# Patient Record
Sex: FEMALE | Race: WHITE | NOT HISPANIC OR LATINO | Employment: OTHER | URBAN - METROPOLITAN AREA
[De-identification: names, ages, dates, MRNs, and addresses within clinical notes are randomized per-mention and may not be internally consistent; named-entity substitution may affect disease eponyms.]

---

## 2017-04-04 ENCOUNTER — TRANSCRIBE ORDERS (OUTPATIENT)
Dept: ADMINISTRATIVE | Facility: HOSPITAL | Age: 67
End: 2017-04-04

## 2017-04-04 DIAGNOSIS — R07.9 CHEST PAIN, UNSPECIFIED: ICD-10-CM

## 2017-04-04 DIAGNOSIS — R06.03 ACUTE RESPIRATORY DISTRESS: Primary | ICD-10-CM

## 2017-04-07 ENCOUNTER — TRANSCRIBE ORDERS (OUTPATIENT)
Dept: ADMINISTRATIVE | Facility: HOSPITAL | Age: 67
End: 2017-04-07

## 2017-04-14 ENCOUNTER — HOSPITAL ENCOUNTER (OUTPATIENT)
Dept: NON INVASIVE DIAGNOSTICS | Facility: CLINIC | Age: 67
Discharge: HOME | End: 2017-04-14
Payer: COMMERCIAL

## 2017-04-14 DIAGNOSIS — R07.9 CHEST PAIN, UNSPECIFIED: ICD-10-CM

## 2017-04-14 DIAGNOSIS — R06.03 ACUTE RESPIRATORY DISTRESS: ICD-10-CM

## 2017-04-14 PROCEDURE — A9502 TC99M TETROFOSMIN: HCPCS

## 2017-04-14 PROCEDURE — 78452 HT MUSCLE IMAGE SPECT MULT: CPT

## 2017-04-14 PROCEDURE — 93017 CV STRESS TEST TRACING ONLY: CPT

## 2017-04-14 RX ADMIN — REGADENOSON 0.4 MG: 0.08 INJECTION, SOLUTION INTRAVENOUS at 10:15

## 2017-04-25 LAB
MAX DIASTOLIC BP: 61 MMHG
MAX HEART RATE: 99 BPM
MAX PREDICTED HEART RATE: 156 BPM
MAX. SYSTOLIC BP: 142 MMHG
PROTOCOL NAME: NORMAL
REASON FOR TERMINATION: NORMAL
TARGET HR FORMULA: NORMAL
TEST INDICATION: NORMAL
TIME IN EXERCISE PHASE: 91 S

## 2017-04-26 ENCOUNTER — ALLSCRIPTS OFFICE VISIT (OUTPATIENT)
Dept: OTHER | Facility: OTHER | Age: 67
End: 2017-04-26

## 2018-01-13 VITALS
WEIGHT: 293 LBS | BODY MASS INDEX: 47.09 KG/M2 | HEART RATE: 68 BPM | DIASTOLIC BLOOD PRESSURE: 60 MMHG | SYSTOLIC BLOOD PRESSURE: 138 MMHG | HEIGHT: 66 IN | RESPIRATION RATE: 16 BRPM

## 2018-01-29 ENCOUNTER — TRANSCRIBE ORDERS (OUTPATIENT)
Dept: ADMINISTRATIVE | Facility: HOSPITAL | Age: 68
End: 2018-01-29

## 2018-01-29 DIAGNOSIS — Z12.39 SCREENING BREAST EXAMINATION: Primary | ICD-10-CM

## 2018-01-29 DIAGNOSIS — Z13.820 SCREENING FOR OSTEOPOROSIS: ICD-10-CM

## 2018-02-05 ENCOUNTER — HOSPITAL ENCOUNTER (OUTPATIENT)
Dept: MAMMOGRAPHY | Facility: CLINIC | Age: 68
Discharge: HOME | End: 2018-02-05
Payer: COMMERCIAL

## 2018-02-05 DIAGNOSIS — Z12.39 SCREENING BREAST EXAMINATION: ICD-10-CM

## 2018-02-05 DIAGNOSIS — Z13.820 SCREENING FOR OSTEOPOROSIS: ICD-10-CM

## 2018-02-05 PROCEDURE — 77067 SCR MAMMO BI INCL CAD: CPT

## 2018-04-06 RX ORDER — TERAZOSIN 5 MG/1
2 CAPSULE ORAL
COMMUNITY
Start: 2015-10-01

## 2018-04-06 RX ORDER — TRAMADOL HYDROCHLORIDE 50 MG/1
1 TABLET ORAL EVERY 6 HOURS PRN
COMMUNITY
Start: 2015-10-01

## 2018-04-06 RX ORDER — FERROUS SULFATE 325(65) MG
1 TABLET ORAL
COMMUNITY
Start: 2015-10-01

## 2018-04-06 RX ORDER — ATORVASTATIN CALCIUM 80 MG/1
1 TABLET, FILM COATED ORAL
COMMUNITY
Start: 2015-10-01

## 2018-04-06 RX ORDER — LANSOPRAZOLE 30 MG/1
1 CAPSULE, DELAYED RELEASE ORAL DAILY
COMMUNITY
Start: 2015-10-01

## 2018-04-06 RX ORDER — HYDROCHLOROTHIAZIDE 25 MG/1
1 TABLET ORAL DAILY
COMMUNITY
Start: 2015-10-01 | End: 2018-12-12 | Stop reason: ALTCHOICE

## 2018-04-06 RX ORDER — FLUTICASONE PROPIONATE 50 MCG
2 SPRAY, SUSPENSION (ML) NASAL DAILY PRN
COMMUNITY
Start: 2015-10-01

## 2018-04-06 RX ORDER — AMLODIPINE BESYLATE 5 MG/1
1 TABLET ORAL DAILY
COMMUNITY
Start: 2015-10-01

## 2018-04-06 RX ORDER — LANCETS
EACH MISCELLANEOUS
COMMUNITY
Start: 2015-12-15

## 2018-04-06 RX ORDER — LEVOTHYROXINE SODIUM ANHYDROUS 200 UG/5ML
INJECTION, POWDER, LYOPHILIZED, FOR SOLUTION INTRAVENOUS
COMMUNITY
End: 2018-05-04 | Stop reason: HOSPADM

## 2018-04-06 RX ORDER — GLIPIZIDE 5 MG/1
1 TABLET ORAL 2 TIMES DAILY
COMMUNITY
Start: 2015-10-01

## 2018-04-06 RX ORDER — CARVEDILOL 25 MG/1
1 TABLET ORAL 2 TIMES DAILY
COMMUNITY
Start: 2015-10-06

## 2018-04-06 RX ORDER — POTASSIUM CHLORIDE 20 MEQ/1
TABLET, EXTENDED RELEASE ORAL 2 TIMES DAILY PRN
COMMUNITY
Start: 2015-10-01

## 2018-04-06 RX ORDER — LOSARTAN POTASSIUM 100 MG/1
1 TABLET ORAL DAILY
COMMUNITY
Start: 2015-10-01

## 2018-04-11 ENCOUNTER — OFFICE VISIT (OUTPATIENT)
Dept: CARDIOLOGY CLINIC | Facility: CLINIC | Age: 68
End: 2018-04-11
Payer: COMMERCIAL

## 2018-04-11 VITALS
HEART RATE: 73 BPM | DIASTOLIC BLOOD PRESSURE: 80 MMHG | BODY MASS INDEX: 47.09 KG/M2 | WEIGHT: 293 LBS | HEIGHT: 66 IN | SYSTOLIC BLOOD PRESSURE: 160 MMHG

## 2018-04-11 DIAGNOSIS — E78.00 HYPERCHOLESTEROLEMIA: ICD-10-CM

## 2018-04-11 DIAGNOSIS — I10 HYPERTENSION, ESSENTIAL, BENIGN: ICD-10-CM

## 2018-04-11 DIAGNOSIS — I25.10 CORONARY ARTERY DISEASE INVOLVING NATIVE CORONARY ARTERY OF NATIVE HEART WITHOUT ANGINA PECTORIS: Primary | ICD-10-CM

## 2018-04-11 DIAGNOSIS — I50.32 CHF (CONGESTIVE HEART FAILURE), NYHA CLASS I, CHRONIC, DIASTOLIC (HCC): ICD-10-CM

## 2018-04-11 PROCEDURE — 93000 ELECTROCARDIOGRAM COMPLETE: CPT | Performed by: INTERNAL MEDICINE

## 2018-04-11 PROCEDURE — 99214 OFFICE O/P EST MOD 30 MIN: CPT | Performed by: INTERNAL MEDICINE

## 2018-04-11 RX ORDER — LEVOTHYROXINE SODIUM 0.05 MG/1
50 TABLET ORAL DAILY
COMMUNITY
Start: 2018-01-09

## 2018-04-11 RX ORDER — FUROSEMIDE 20 MG/1
20 TABLET ORAL DAILY PRN
COMMUNITY
Start: 2018-01-14

## 2018-04-11 NOTE — PROGRESS NOTES
Cardiology Follow Up    Justine Mckeon  1950  [id number]  [address] [facility] Bridgett Richard  [address] [place], [place] [id number]    1  Coronary artery disease involving native coronary artery of native heart without angina pectoris  POCT ECG   2  Hypercholesterolemia     3  Hypertension, essential, benign  POCT ECG   4  CHF (congestive heart failure), NYHA class I, chronic, diastolic (HCC)         Interval History:  Cardiology follow-up  Patient continues to be frustrated with her symptoms, she does have dyspnea class 3 at the present time  There appears to be no crescendo pattern, she has had no chest discomfort, she complains of this dry cough with dyspnea  She tells me the symptoms are very similar to what he experienced prior to her initial revascularization back in 2002  She did receive stents in the circumflex and RCA system at that time, she received stents about 3 of 4 years later  About 5 years ago when she was living in [place], she tells me that she was scheduled to have a stent placed  This was not performed because of anemia I do not have the official report  We did a stress test last year that was read as fixed inferior defect and no ischemia  Left ventricular systolic function is normal on echocardiogram with diastolic dysfunction, there is mild tricuspid insufficiency and mild pulmonary hypertension  Patient Active Problem List   Diagnosis    CHF (congestive heart failure), NYHA class I, chronic, diastolic (HCC)     No past medical history on file  Social History     Social History    Marital status: /Civil Union     Spouse name: N/A    Number of children: N/A    Years of education: N/A     Occupational History    Not on file       Social History Main Topics    Smoking status: Not on file    Smokeless tobacco: Not on file    Alcohol use Not on file    Drug use: Unknown    Sexual activity: Not on file     Other Topics Concern    Not on file     Social History Narrative    No narrative on file      No family history on file  No past surgical history on file      Current Outpatient Prescriptions:     amLODIPine (NORVASC) 5 mg tablet, Take 1 tablet by mouth daily, Disp: , Rfl:     aspirin 81 MG tablet, Take 1 tablet by mouth daily, Disp: , Rfl:     atorvastatin (LIPITOR) 80 mg tablet, Take 1 tablet by mouth, Disp: , Rfl:     carvedilol (COREG) 25 mg tablet, Take 1 tablet by mouth 2 (two) times a day, Disp: , Rfl:     ferrous sulfate 325 (65 Fe) mg tablet, Take 1 tablet by mouth Twice daily, Disp: , Rfl:     fluticasone (FLONASE) 50 mcg/act nasal spray, 2 sprays into each nostril daily as needed, Disp: , Rfl:     furosemide (LASIX) 20 mg tablet, 20 mg daily  , Disp: , Rfl:     glipiZIDE (GLUCOTROL) 5 mg tablet, Take 1 tablet by mouth 2 (two) times a day, Disp: , Rfl:     hydrochlorothiazide (HYDRODIURIL) 25 mg tablet, Take 1 tablet by mouth daily, Disp: , Rfl:     Lancets (ONETOUCH ULTRASOFT) lancets, by Does not apply route, Disp: , Rfl:     lansoprazole (PREVACID) 30 mg capsule, Take 1 capsule by mouth daily, Disp: , Rfl:     levothyroxine 50 mcg tablet, , Disp: , Rfl:     losartan (COZAAR) 100 MG tablet, Take 1 tablet by mouth daily, Disp: , Rfl:     metFORMIN (GLUCOPHAGE) 1000 MG tablet, Take 1 tablet by mouth 2 (two) times a day, Disp: , Rfl:     mometasone (ASMANEX 30 METERED DOSES) 220 MCG/INH inhaler, Inhale, Disp: , Rfl:     potassium chloride (KLOR-CON M20) 20 mEq tablet, Take by mouth daily  , Disp: , Rfl:     terazosin (HYTRIN) 5 mg capsule, Take 2 capsules by mouth daily, Disp: , Rfl:     traMADol (ULTRAM) 50 mg tablet, Take 1 tablet by mouth every 6 (six) hours as needed, Disp: , Rfl:     Levothyroxine Sodium 200 MCG, Inject as directed, Disp: , Rfl:   Allergies   Allergen Reactions    Penicillins Anaphylaxis       Labs:  No visits with results within 6 Month(s) from this visit  Latest known visit with results is:   Hospital Outpatient Visit on 04/14/2017   Component Date Value    Protocol Name 04/14/2017 MARIO WALK            Time In Exercise Phase 04/14/2017 91     MAX  SYSTOLIC BP 76/67/0264 737     Max Diastolic Bp 06/11/7865 61     Max Heart Rate 04/14/2017 99     Max Predicted Heart Rate 04/14/2017 156     Reason for Termination 04/14/2017 PROTOCOL COMPLETED     Test Indication 04/14/2017 Dyspnea     Target Hr Formular 04/14/2017 (220 - Age)*100%      Imaging: No results found  Review of Systems:  Review of Systems   Constitutional: Positive for fatigue  Negative for activity change, appetite change and unexpected weight change  HENT: Positive for postnasal drip  Negative for nosebleeds, sore throat and trouble swallowing  Eyes: Negative for visual disturbance  Respiratory: Positive for cough, choking and shortness of breath  Negative for apnea and stridor  Cardiovascular: Positive for leg swelling  Negative for chest pain and palpitations  Gastrointestinal: Negative for abdominal pain and blood in stool  Endocrine: Negative for cold intolerance  Genitourinary: Negative for difficulty urinating and hematuria  Musculoskeletal: Positive for arthralgias, back pain and gait problem  Negative for myalgias  Skin: Negative for pallor and rash  Allergic/Immunologic: Negative for immunocompromised state  Neurological: Negative for dizziness, tremors, syncope, speech difficulty, weakness and numbness  Hematological: Does not bruise/bleed easily  Psychiatric/Behavioral: Negative for confusion  Physical Exam:  Physical Exam   Constitutional: She is oriented to person, place, and time  No distress (Morbidly obese)  Eyes: No scleral icterus  Neck: No JVD present  No tracheal deviation present  Cardiovascular: Regular rhythm, S1 normal and S2 normal   Frequent extrasystoles are present  Exam reveals gallop, S4 and distant heart sounds   Exam reveals no friction rub  No murmur heard  Pulses:       Carotid pulses are 2+ on the right side, and 2+ on the left side  Dorsalis pedis pulses are 1+ on the right side, and 1+ on the left side  Posterior tibial pulses are 1+ on the right side, and 1+ on the left side  2+ soft pitting edema   Pulmonary/Chest: Effort normal and breath sounds normal  No stridor  No respiratory distress  She has no wheezes  She has no rales  She exhibits no tenderness  Neurological: She is alert and oriented to person, place, and time  Skin: Skin is warm and dry  She is not diaphoretic  Psychiatric: She has a normal mood and affect  Discussion/Summary:  Chronic diastolic congestive failure, class 3 at the present time  Patient with known extensive coronary disease multiple revascularizations in the past   Non descriptive recent stress test   Options included continue clinical regimen, poor response to diuretic regimen  Empiric nitrate therapy plus perhaps Ranexa  Or invasive evaluation right and left heart catheterization  The patient is very frustrated with symptoms, after lengthy discussion will decide any favor of invasive evaluation, cardiac catheterization has been recommended  The procedure was thoroughly explained to the patient, as well as his potential risk and complications, including but not limited to death, myocardial infarction, vascular complications with bleeding or thrombosis, CVA  Options including ongoing or escalating medical therapy or expectant therapy as well as the risk associated with this approach were discussed with the patient  My recommendation is that the potential benefits clearly outweigh the risk of the procedure  Questions and concerns were answered to the patient's satisfaction  The patient understands, agrees and wishes to proceed

## 2018-04-18 LAB
CHOLEST SERPL-MCNC: 125 MG/DL (ref 100–199)
HDLC SERPL-MCNC: 48 MG/DL
LDLC SERPL CALC-MCNC: 57 MG/DL (ref 0–99)
TRIGL SERPL-MCNC: 100 MG/DL (ref 0–149)

## 2018-05-02 ENCOUNTER — TELEPHONE (OUTPATIENT)
Dept: INPATIENT UNIT | Facility: HOSPITAL | Age: 68
End: 2018-05-02

## 2018-05-02 RX ORDER — SODIUM CHLORIDE 9 MG/ML
125 INJECTION, SOLUTION INTRAVENOUS CONTINUOUS
Status: CANCELLED | OUTPATIENT
Start: 2018-05-02 | End: 2018-05-02

## 2018-05-02 RX ORDER — ASPIRIN 81 MG/1
324 TABLET, CHEWABLE ORAL ONCE
Status: CANCELLED | OUTPATIENT
Start: 2018-05-02 | End: 2018-05-02

## 2018-05-03 ENCOUNTER — HOSPITAL ENCOUNTER (OUTPATIENT)
Dept: NON INVASIVE DIAGNOSTICS | Facility: HOSPITAL | Age: 68
Discharge: HOME | End: 2018-05-04
Attending: INTERNAL MEDICINE | Admitting: INTERNAL MEDICINE
Payer: COMMERCIAL

## 2018-05-03 DIAGNOSIS — I50.32 CHF (CONGESTIVE HEART FAILURE), NYHA CLASS I, CHRONIC, DIASTOLIC (HCC): ICD-10-CM

## 2018-05-03 DIAGNOSIS — I25.10 CORONARY ARTERY DISEASE INVOLVING NATIVE CORONARY ARTERY OF NATIVE HEART WITHOUT ANGINA PECTORIS: Primary | ICD-10-CM

## 2018-05-03 LAB
ANION GAP SERPL CALCULATED.3IONS-SCNC: 5 MMOL/L (ref 4–13)
ATRIAL RATE: 65 BPM
BASOPHILS # BLD AUTO: 0.03 THOUSANDS/ΜL (ref 0–0.1)
BASOPHILS NFR BLD AUTO: 1 % (ref 0–1)
BUN SERPL-MCNC: 17 MG/DL (ref 5–25)
CALCIUM SERPL-MCNC: 9.2 MG/DL (ref 8.3–10.1)
CHLORIDE SERPL-SCNC: 106 MMOL/L (ref 100–108)
CO2 SERPL-SCNC: 28 MMOL/L (ref 21–32)
CREAT SERPL-MCNC: 0.86 MG/DL (ref 0.6–1.3)
EOSINOPHIL # BLD AUTO: 0.08 THOUSAND/ΜL (ref 0–0.61)
EOSINOPHIL NFR BLD AUTO: 1 % (ref 0–6)
ERYTHROCYTE [DISTWIDTH] IN BLOOD BY AUTOMATED COUNT: 14.2 % (ref 11.6–15.1)
GFR SERPL CREATININE-BSD FRML MDRD: 71 ML/MIN/1.73SQ M
GLUCOSE P FAST SERPL-MCNC: 135 MG/DL (ref 65–99)
GLUCOSE SERPL-MCNC: 135 MG/DL (ref 65–140)
HCT VFR BLD AUTO: 34.3 % (ref 34.8–46.1)
HGB BLD-MCNC: 10.8 G/DL (ref 11.5–15.4)
INR PPP: 1.12 (ref 0.86–1.16)
LYMPHOCYTES # BLD AUTO: 1.3 THOUSANDS/ΜL (ref 0.6–4.47)
LYMPHOCYTES NFR BLD AUTO: 22 % (ref 14–44)
MCH RBC QN AUTO: 29.4 PG (ref 26.8–34.3)
MCHC RBC AUTO-ENTMCNC: 31.5 G/DL (ref 31.4–37.4)
MCV RBC AUTO: 94 FL (ref 82–98)
MONOCYTES # BLD AUTO: 0.37 THOUSAND/ΜL (ref 0.17–1.22)
MONOCYTES NFR BLD AUTO: 6 % (ref 4–12)
NEUTROPHILS # BLD AUTO: 4.14 THOUSANDS/ΜL (ref 1.85–7.62)
NEUTS SEG NFR BLD AUTO: 70 % (ref 43–75)
NRBC BLD AUTO-RTO: 0 /100 WBCS
P AXIS: 35 DEGREES
PLATELET # BLD AUTO: 157 THOUSANDS/UL (ref 149–390)
PMV BLD AUTO: 8.2 FL (ref 8.9–12.7)
POTASSIUM SERPL-SCNC: 4 MMOL/L (ref 3.5–5.3)
PR INTERVAL: 220 MS
PROTHROMBIN TIME: 14.4 SECONDS (ref 12.1–14.4)
QRS AXIS: -32 DEGREES
QRSD INTERVAL: 116 MS
QT INTERVAL: 428 MS
QTC INTERVAL: 445 MS
RBC # BLD AUTO: 3.67 MILLION/UL (ref 3.81–5.12)
SODIUM SERPL-SCNC: 139 MMOL/L (ref 136–145)
T WAVE AXIS: 11 DEGREES
VENTRICULAR RATE: 65 BPM
WBC # BLD AUTO: 5.94 THOUSAND/UL (ref 4.31–10.16)

## 2018-05-03 PROCEDURE — C1887 CATHETER, GUIDING: HCPCS | Performed by: INTERNAL MEDICINE

## 2018-05-03 PROCEDURE — 93454 CORONARY ARTERY ANGIO S&I: CPT | Performed by: INTERNAL MEDICINE

## 2018-05-03 PROCEDURE — C1769 GUIDE WIRE: HCPCS | Performed by: INTERNAL MEDICINE

## 2018-05-03 PROCEDURE — C1725 CATH, TRANSLUMIN NON-LASER: HCPCS | Performed by: INTERNAL MEDICINE

## 2018-05-03 PROCEDURE — C1894 INTRO/SHEATH, NON-LASER: HCPCS | Performed by: INTERNAL MEDICINE

## 2018-05-03 PROCEDURE — 99153 MOD SED SAME PHYS/QHP EA: CPT | Performed by: INTERNAL MEDICINE

## 2018-05-03 PROCEDURE — 99152 MOD SED SAME PHYS/QHP 5/>YRS: CPT | Performed by: INTERNAL MEDICINE

## 2018-05-03 PROCEDURE — 92928 PRQ TCAT PLMT NTRAC ST 1 LES: CPT | Performed by: INTERNAL MEDICINE

## 2018-05-03 PROCEDURE — 85347 COAGULATION TIME ACTIVATED: CPT

## 2018-05-03 PROCEDURE — 80048 BASIC METABOLIC PNL TOTAL CA: CPT | Performed by: INTERNAL MEDICINE

## 2018-05-03 PROCEDURE — 85025 COMPLETE CBC W/AUTO DIFF WBC: CPT | Performed by: INTERNAL MEDICINE

## 2018-05-03 PROCEDURE — C1753 CATH, INTRAVAS ULTRASOUND: HCPCS | Performed by: INTERNAL MEDICINE

## 2018-05-03 PROCEDURE — C9600 PERC DRUG-EL COR STENT SING: HCPCS | Performed by: INTERNAL MEDICINE

## 2018-05-03 PROCEDURE — 92978 ENDOLUMINL IVUS OCT C 1ST: CPT | Performed by: INTERNAL MEDICINE

## 2018-05-03 PROCEDURE — C1874 STENT, COATED/COV W/DEL SYS: HCPCS

## 2018-05-03 PROCEDURE — 93005 ELECTROCARDIOGRAM TRACING: CPT

## 2018-05-03 PROCEDURE — 93010 ELECTROCARDIOGRAM REPORT: CPT | Performed by: INTERNAL MEDICINE

## 2018-05-03 PROCEDURE — 85610 PROTHROMBIN TIME: CPT | Performed by: INTERNAL MEDICINE

## 2018-05-03 RX ORDER — PRASUGREL 10 MG/1
TABLET, FILM COATED ORAL CODE/TRAUMA/SEDATION MEDICATION
Status: COMPLETED | OUTPATIENT
Start: 2018-05-03 | End: 2018-05-03

## 2018-05-03 RX ORDER — AMLODIPINE BESYLATE 5 MG/1
5 TABLET ORAL DAILY
Status: DISCONTINUED | OUTPATIENT
Start: 2018-05-04 | End: 2018-05-04 | Stop reason: HOSPADM

## 2018-05-03 RX ORDER — VERAPAMIL HYDROCHLORIDE 2.5 MG/ML
INJECTION, SOLUTION INTRAVENOUS CODE/TRAUMA/SEDATION MEDICATION
Status: COMPLETED | OUTPATIENT
Start: 2018-05-03 | End: 2018-05-03

## 2018-05-03 RX ORDER — NITROGLYCERIN 20 MG/100ML
INJECTION INTRAVENOUS CODE/TRAUMA/SEDATION MEDICATION
Status: COMPLETED | OUTPATIENT
Start: 2018-05-03 | End: 2018-05-03

## 2018-05-03 RX ORDER — FLUTICASONE PROPIONATE 220 UG/1
2 AEROSOL, METERED RESPIRATORY (INHALATION)
Status: DISCONTINUED | OUTPATIENT
Start: 2018-05-03 | End: 2018-05-04 | Stop reason: HOSPADM

## 2018-05-03 RX ORDER — HEPARIN SODIUM 1000 [USP'U]/ML
INJECTION, SOLUTION INTRAVENOUS; SUBCUTANEOUS CODE/TRAUMA/SEDATION MEDICATION
Status: COMPLETED | OUTPATIENT
Start: 2018-05-03 | End: 2018-05-03

## 2018-05-03 RX ORDER — ASPIRIN 81 MG/1
324 TABLET, CHEWABLE ORAL ONCE
Status: COMPLETED | OUTPATIENT
Start: 2018-05-03 | End: 2018-05-03

## 2018-05-03 RX ORDER — LIDOCAINE HYDROCHLORIDE 10 MG/ML
INJECTION, SOLUTION INFILTRATION; PERINEURAL CODE/TRAUMA/SEDATION MEDICATION
Status: COMPLETED | OUTPATIENT
Start: 2018-05-03 | End: 2018-05-03

## 2018-05-03 RX ORDER — TRAMADOL HYDROCHLORIDE 50 MG/1
50 TABLET ORAL EVERY 6 HOURS PRN
Status: DISCONTINUED | OUTPATIENT
Start: 2018-05-03 | End: 2018-05-04 | Stop reason: HOSPADM

## 2018-05-03 RX ORDER — SODIUM CHLORIDE 9 MG/ML
125 INJECTION, SOLUTION INTRAVENOUS CONTINUOUS
Status: DISCONTINUED | OUTPATIENT
Start: 2018-05-03 | End: 2018-05-03

## 2018-05-03 RX ORDER — ATORVASTATIN CALCIUM 80 MG/1
80 TABLET, FILM COATED ORAL
Status: DISCONTINUED | OUTPATIENT
Start: 2018-05-03 | End: 2018-05-04 | Stop reason: HOSPADM

## 2018-05-03 RX ORDER — ASPIRIN 81 MG/1
81 TABLET, CHEWABLE ORAL DAILY
Status: DISCONTINUED | OUTPATIENT
Start: 2018-05-04 | End: 2018-05-04 | Stop reason: HOSPADM

## 2018-05-03 RX ORDER — LEVOTHYROXINE SODIUM 0.05 MG/1
50 TABLET ORAL
Status: DISCONTINUED | OUTPATIENT
Start: 2018-05-03 | End: 2018-05-04 | Stop reason: HOSPADM

## 2018-05-03 RX ORDER — PRASUGREL 10 MG/1
10 TABLET, FILM COATED ORAL DAILY
Status: DISCONTINUED | OUTPATIENT
Start: 2018-05-04 | End: 2018-05-04 | Stop reason: HOSPADM

## 2018-05-03 RX ORDER — MIDAZOLAM HYDROCHLORIDE 1 MG/ML
INJECTION INTRAMUSCULAR; INTRAVENOUS CODE/TRAUMA/SEDATION MEDICATION
Status: COMPLETED | OUTPATIENT
Start: 2018-05-03 | End: 2018-05-03

## 2018-05-03 RX ORDER — FENTANYL CITRATE 50 UG/ML
INJECTION, SOLUTION INTRAMUSCULAR; INTRAVENOUS CODE/TRAUMA/SEDATION MEDICATION
Status: COMPLETED | OUTPATIENT
Start: 2018-05-03 | End: 2018-05-03

## 2018-05-03 RX ORDER — SODIUM CHLORIDE 9 MG/ML
100 INJECTION, SOLUTION INTRAVENOUS CONTINUOUS
Status: DISCONTINUED | OUTPATIENT
Start: 2018-05-03 | End: 2018-05-03

## 2018-05-03 RX ORDER — CARVEDILOL 25 MG/1
25 TABLET ORAL 2 TIMES DAILY WITH MEALS
Status: DISCONTINUED | OUTPATIENT
Start: 2018-05-03 | End: 2018-05-04 | Stop reason: HOSPADM

## 2018-05-03 RX ORDER — FUROSEMIDE 20 MG/1
20 TABLET ORAL DAILY
Status: DISCONTINUED | OUTPATIENT
Start: 2018-05-03 | End: 2018-05-04 | Stop reason: HOSPADM

## 2018-05-03 RX ORDER — TERAZOSIN 5 MG/1
10 CAPSULE ORAL DAILY
Status: DISCONTINUED | OUTPATIENT
Start: 2018-05-03 | End: 2018-05-04 | Stop reason: HOSPADM

## 2018-05-03 RX ADMIN — FENTANYL CITRATE 50 MCG: 50 INJECTION, SOLUTION INTRAMUSCULAR; INTRAVENOUS at 09:11

## 2018-05-03 RX ADMIN — NITROGLYCERIN 200 MCG: 20 INJECTION INTRAVENOUS at 09:19

## 2018-05-03 RX ADMIN — IOHEXOL 100 ML: 350 INJECTION, SOLUTION INTRAVENOUS at 09:30

## 2018-05-03 RX ADMIN — MIDAZOLAM 1 MG: 1 INJECTION INTRAMUSCULAR; INTRAVENOUS at 09:55

## 2018-05-03 RX ADMIN — HEPARIN SODIUM 3000 UNITS: 1000 INJECTION INTRAVENOUS; SUBCUTANEOUS at 10:35

## 2018-05-03 RX ADMIN — VERAPAMIL HYDROCHLORIDE 2.5 MG: 2.5 INJECTION INTRAVENOUS at 09:20

## 2018-05-03 RX ADMIN — FENTANYL CITRATE 50 MCG: 50 INJECTION, SOLUTION INTRAMUSCULAR; INTRAVENOUS at 10:34

## 2018-05-03 RX ADMIN — ATORVASTATIN CALCIUM 80 MG: 80 TABLET, FILM COATED ORAL at 16:24

## 2018-05-03 RX ADMIN — FENTANYL CITRATE 50 MCG: 50 INJECTION, SOLUTION INTRAMUSCULAR; INTRAVENOUS at 09:55

## 2018-05-03 RX ADMIN — HEPARIN SODIUM 2000 UNITS: 1000 INJECTION INTRAVENOUS; SUBCUTANEOUS at 09:44

## 2018-05-03 RX ADMIN — VERAPAMIL HYDROCHLORIDE 2.5 MG: 2.5 INJECTION INTRAVENOUS at 09:22

## 2018-05-03 RX ADMIN — CARVEDILOL 25 MG: 25 TABLET, FILM COATED ORAL at 20:13

## 2018-05-03 RX ADMIN — MIDAZOLAM 1 MG: 1 INJECTION INTRAMUSCULAR; INTRAVENOUS at 09:36

## 2018-05-03 RX ADMIN — MIDAZOLAM 1 MG: 1 INJECTION INTRAMUSCULAR; INTRAVENOUS at 10:18

## 2018-05-03 RX ADMIN — FENTANYL CITRATE 50 MCG: 50 INJECTION, SOLUTION INTRAMUSCULAR; INTRAVENOUS at 09:20

## 2018-05-03 RX ADMIN — ASPIRIN 81 MG 324 MG: 81 TABLET ORAL at 07:22

## 2018-05-03 RX ADMIN — TERAZOSIN HYDROCHLORIDE 10 MG: 5 CAPSULE ORAL at 16:24

## 2018-05-03 RX ADMIN — MIDAZOLAM 1 MG: 1 INJECTION INTRAMUSCULAR; INTRAVENOUS at 10:34

## 2018-05-03 RX ADMIN — MIDAZOLAM 2 MG: 1 INJECTION INTRAMUSCULAR; INTRAVENOUS at 09:12

## 2018-05-03 RX ADMIN — NITROGLYCERIN 1 INCH: 20 OINTMENT TOPICAL at 09:36

## 2018-05-03 RX ADMIN — FENTANYL CITRATE 50 MCG: 50 INJECTION, SOLUTION INTRAMUSCULAR; INTRAVENOUS at 10:18

## 2018-05-03 RX ADMIN — SODIUM CHLORIDE 125 ML/HR: 0.9 INJECTION, SOLUTION INTRAVENOUS at 07:22

## 2018-05-03 RX ADMIN — HEPARIN SODIUM 10000 UNITS: 1000 INJECTION INTRAVENOUS; SUBCUTANEOUS at 09:34

## 2018-05-03 RX ADMIN — MIDAZOLAM 1 MG: 1 INJECTION INTRAMUSCULAR; INTRAVENOUS at 09:20

## 2018-05-03 RX ADMIN — IOHEXOL 80 ML: 350 INJECTION, SOLUTION INTRAVENOUS at 11:02

## 2018-05-03 RX ADMIN — PRASUGREL HYDROCHLORIDE 60 MG: 10 TABLET, FILM COATED ORAL at 09:35

## 2018-05-03 RX ADMIN — HEPARIN SODIUM 4000 UNITS: 1000 INJECTION INTRAVENOUS; SUBCUTANEOUS at 09:20

## 2018-05-03 RX ADMIN — NITROGLYCERIN 200 MCG: 20 INJECTION INTRAVENOUS at 09:48

## 2018-05-03 RX ADMIN — FENTANYL CITRATE 50 MCG: 50 INJECTION, SOLUTION INTRAMUSCULAR; INTRAVENOUS at 09:36

## 2018-05-03 RX ADMIN — LEVOTHYROXINE SODIUM 50 MCG: 50 TABLET ORAL at 16:24

## 2018-05-03 RX ADMIN — LIDOCAINE HYDROCHLORIDE 1 ML: 10 INJECTION, SOLUTION INFILTRATION; PERINEURAL at 09:16

## 2018-05-03 NOTE — INTERVAL H&P NOTE
H&P reviewed  After examining the patient, I find no changed to the H&P since it had been written  Patient re-evaluated   Accept as history and physical     Jude Almonte MD/May 3, 2018/7:40 AM

## 2018-05-03 NOTE — H&P (VIEW-ONLY)
Cardiology Follow Up    Tabatha Hernandez  1950  [id number]  [address] [facility]  [address] [place] [address]    1  Coronary artery disease involving native coronary artery of native heart without angina pectoris  POCT ECG   2  Hypercholesterolemia     3  Hypertension, essential, benign  POCT ECG   4  CHF (congestive heart failure), NYHA class I, chronic, diastolic (HCC)         Interval History:  Cardiology follow-up  Patient continues to be frustrated with her symptoms, she does have dyspnea class 3 at the present time  There appears to be no crescendo pattern, she has had no chest discomfort, she complains of this dry cough with dyspnea  She tells me the symptoms are very similar to what he experienced prior to her initial revascularization back in 2002  She did receive stents in the circumflex and RCA system at that time, she received stents about 3 of 4 years later  About 5 years ago when she was living in [place], she tells me that she was scheduled to have a stent placed  This was not performed because of anemia I do not have the official report  We did a stress test last year that was read as fixed inferior defect and no ischemia  Left ventricular systolic function is normal on echocardiogram with diastolic dysfunction, there is mild tricuspid insufficiency and mild pulmonary hypertension  Patient Active Problem List   Diagnosis    CHF (congestive heart failure), NYHA class I, chronic, diastolic (HCC)     No past medical history on file  Social History     Social History    Marital status: /Civil Union     Spouse name: N/A    Number of children: N/A    Years of education: N/A     Occupational History    Not on file       Social History Main Topics    Smoking status: Not on file    Smokeless tobacco: Not on file    Alcohol use Not on file    Drug use: Unknown    Sexual activity: Not on file     Other Topics Concern    Not on file     Social History Narrative    No narrative on file      No family history on file  No past surgical history on file      Current Outpatient Prescriptions:     amLODIPine (NORVASC) 5 mg tablet, Take 1 tablet by mouth daily, Disp: , Rfl:     aspirin 81 MG tablet, Take 1 tablet by mouth daily, Disp: , Rfl:     atorvastatin (LIPITOR) 80 mg tablet, Take 1 tablet by mouth, Disp: , Rfl:     carvedilol (COREG) 25 mg tablet, Take 1 tablet by mouth 2 (two) times a day, Disp: , Rfl:     ferrous sulfate 325 (65 Fe) mg tablet, Take 1 tablet by mouth Twice daily, Disp: , Rfl:     fluticasone (FLONASE) 50 mcg/act nasal spray, 2 sprays into each nostril daily as needed, Disp: , Rfl:     furosemide (LASIX) 20 mg tablet, 20 mg daily  , Disp: , Rfl:     glipiZIDE (GLUCOTROL) 5 mg tablet, Take 1 tablet by mouth 2 (two) times a day, Disp: , Rfl:     hydrochlorothiazide (HYDRODIURIL) 25 mg tablet, Take 1 tablet by mouth daily, Disp: , Rfl:     Lancets (ONETOUCH ULTRASOFT) lancets, by Does not apply route, Disp: , Rfl:     lansoprazole (PREVACID) 30 mg capsule, Take 1 capsule by mouth daily, Disp: , Rfl:     levothyroxine 50 mcg tablet, , Disp: , Rfl:     losartan (COZAAR) 100 MG tablet, Take 1 tablet by mouth daily, Disp: , Rfl:     metFORMIN (GLUCOPHAGE) 1000 MG tablet, Take 1 tablet by mouth 2 (two) times a day, Disp: , Rfl:     mometasone (ASMANEX 30 METERED DOSES) 220 MCG/INH inhaler, Inhale, Disp: , Rfl:     potassium chloride (KLOR-CON M20) 20 mEq tablet, Take by mouth daily  , Disp: , Rfl:     terazosin (HYTRIN) 5 mg capsule, Take 2 capsules by mouth daily, Disp: , Rfl:     traMADol (ULTRAM) 50 mg tablet, Take 1 tablet by mouth every 6 (six) hours as needed, Disp: , Rfl:     Levothyroxine Sodium 200 MCG, Inject as directed, Disp: , Rfl:   Allergies   Allergen Reactions    Penicillins Anaphylaxis       Labs:  No visits with results within 6 Month(s) from this visit  Latest known visit with results is:   Hospital Outpatient Visit on 04/14/2017   Component Date Value    Protocol Name 04/14/2017 MARIO WALK            Time In Exercise Phase 04/14/2017 91     MAX  SYSTOLIC BP 73/35/4163 843     Max Diastolic Bp 75/39/6610 61     Max Heart Rate 04/14/2017 99     Max Predicted Heart Rate 04/14/2017 156     Reason for Termination 04/14/2017 PROTOCOL COMPLETED     Test Indication 04/14/2017 Dyspnea     Target Hr Formular 04/14/2017 (220 - Age)*100%      Imaging: No results found  Review of Systems:  Review of Systems   Constitutional: Positive for fatigue  Negative for activity change, appetite change and unexpected weight change  HENT: Positive for postnasal drip  Negative for nosebleeds, sore throat and trouble swallowing  Eyes: Negative for visual disturbance  Respiratory: Positive for cough, choking and shortness of breath  Negative for apnea and stridor  Cardiovascular: Positive for leg swelling  Negative for chest pain and palpitations  Gastrointestinal: Negative for abdominal pain and blood in stool  Endocrine: Negative for cold intolerance  Genitourinary: Negative for difficulty urinating and hematuria  Musculoskeletal: Positive for arthralgias, back pain and gait problem  Negative for myalgias  Skin: Negative for pallor and rash  Allergic/Immunologic: Negative for immunocompromised state  Neurological: Negative for dizziness, tremors, syncope, speech difficulty, weakness and numbness  Hematological: Does not bruise/bleed easily  Psychiatric/Behavioral: Negative for confusion  Physical Exam:  Physical Exam   Constitutional: She is oriented to person, place, and time  No distress (Morbidly obese)  Eyes: No scleral icterus  Neck: No JVD present  No tracheal deviation present  Cardiovascular: Regular rhythm, S1 normal and S2 normal   Frequent extrasystoles are present  Exam reveals gallop, S4 and distant heart sounds   Exam reveals no friction rub  No murmur heard  Pulses:       Carotid pulses are 2+ on the right side, and 2+ on the left side  Dorsalis pedis pulses are 1+ on the right side, and 1+ on the left side  Posterior tibial pulses are 1+ on the right side, and 1+ on the left side  2+ soft pitting edema   Pulmonary/Chest: Effort normal and breath sounds normal  No stridor  No respiratory distress  She has no wheezes  She has no rales  She exhibits no tenderness  Neurological: She is alert and oriented to person, place, and time  Skin: Skin is warm and dry  She is not diaphoretic  Psychiatric: She has a normal mood and affect  Discussion/Summary:  Chronic diastolic congestive failure, class 3 at the present time  Patient with known extensive coronary disease multiple revascularizations in the past   Non descriptive recent stress test   Options included continue clinical regimen, poor response to diuretic regimen  Empiric nitrate therapy plus perhaps Ranexa  Or invasive evaluation right and left heart catheterization  The patient is very frustrated with symptoms, after lengthy discussion will decide any favor of invasive evaluation, cardiac catheterization has been recommended  The procedure was thoroughly explained to the patient, as well as his potential risk and complications, including but not limited to death, myocardial infarction, vascular complications with bleeding or thrombosis, CVA  Options including ongoing or escalating medical therapy or expectant therapy as well as the risk associated with this approach were discussed with the patient  My recommendation is that the potential benefits clearly outweigh the risk of the procedure  Questions and concerns were answered to the patient's satisfaction  The patient understands, agrees and wishes to proceed

## 2018-05-04 VITALS
BODY MASS INDEX: 48.39 KG/M2 | WEIGHT: 293 LBS | OXYGEN SATURATION: 97 % | HEART RATE: 67 BPM | RESPIRATION RATE: 18 BRPM | SYSTOLIC BLOOD PRESSURE: 139 MMHG | DIASTOLIC BLOOD PRESSURE: 62 MMHG | TEMPERATURE: 98.1 F

## 2018-05-04 LAB
ANION GAP SERPL CALCULATED.3IONS-SCNC: 6 MMOL/L (ref 4–13)
BUN SERPL-MCNC: 17 MG/DL (ref 5–25)
CALCIUM SERPL-MCNC: 8.6 MG/DL (ref 8.3–10.1)
CHLORIDE SERPL-SCNC: 107 MMOL/L (ref 100–108)
CO2 SERPL-SCNC: 27 MMOL/L (ref 21–32)
CREAT SERPL-MCNC: 0.87 MG/DL (ref 0.6–1.3)
ERYTHROCYTE [DISTWIDTH] IN BLOOD BY AUTOMATED COUNT: 14.6 % (ref 11.6–15.1)
GFR SERPL CREATININE-BSD FRML MDRD: 70 ML/MIN/1.73SQ M
GLUCOSE SERPL-MCNC: 120 MG/DL (ref 65–140)
GLUCOSE SERPL-MCNC: 128 MG/DL (ref 65–140)
HCT VFR BLD AUTO: 31.8 % (ref 34.8–46.1)
HGB BLD-MCNC: 9.8 G/DL (ref 11.5–15.4)
MAGNESIUM SERPL-MCNC: 2 MG/DL (ref 1.6–2.6)
MCH RBC QN AUTO: 29.4 PG (ref 26.8–34.3)
MCHC RBC AUTO-ENTMCNC: 30.8 G/DL (ref 31.4–37.4)
MCV RBC AUTO: 96 FL (ref 82–98)
PLATELET # BLD AUTO: 137 THOUSANDS/UL (ref 149–390)
PMV BLD AUTO: 8.4 FL (ref 8.9–12.7)
POTASSIUM SERPL-SCNC: 3.8 MMOL/L (ref 3.5–5.3)
RBC # BLD AUTO: 3.33 MILLION/UL (ref 3.81–5.12)
SODIUM SERPL-SCNC: 140 MMOL/L (ref 136–145)
WBC # BLD AUTO: 6.08 THOUSAND/UL (ref 4.31–10.16)

## 2018-05-04 PROCEDURE — 82948 REAGENT STRIP/BLOOD GLUCOSE: CPT

## 2018-05-04 PROCEDURE — 80048 BASIC METABOLIC PNL TOTAL CA: CPT | Performed by: INTERNAL MEDICINE

## 2018-05-04 PROCEDURE — 83735 ASSAY OF MAGNESIUM: CPT | Performed by: INTERNAL MEDICINE

## 2018-05-04 PROCEDURE — 85027 COMPLETE CBC AUTOMATED: CPT | Performed by: INTERNAL MEDICINE

## 2018-05-04 RX ORDER — PRASUGREL 10 MG/1
10 TABLET, FILM COATED ORAL DAILY
Qty: 30 TABLET | Refills: 6 | Status: SHIPPED | OUTPATIENT
Start: 2018-05-04 | End: 2018-12-12 | Stop reason: SDUPTHER

## 2018-05-04 RX ORDER — NITROGLYCERIN 0.4 MG/1
0.4 TABLET SUBLINGUAL
Status: DISCONTINUED | OUTPATIENT
Start: 2018-05-04 | End: 2018-05-04 | Stop reason: HOSPADM

## 2018-05-04 RX ORDER — NITROGLYCERIN 0.4 MG/1
0.4 TABLET SUBLINGUAL
Qty: 90 TABLET | Refills: 3 | Status: SHIPPED | OUTPATIENT
Start: 2018-05-04

## 2018-05-04 RX ADMIN — CARVEDILOL 25 MG: 25 TABLET, FILM COATED ORAL at 09:10

## 2018-05-04 RX ADMIN — ASPIRIN 81 MG 81 MG: 81 TABLET ORAL at 09:10

## 2018-05-04 RX ADMIN — AMLODIPINE BESYLATE 5 MG: 5 TABLET ORAL at 09:10

## 2018-05-04 RX ADMIN — TERAZOSIN HYDROCHLORIDE 10 MG: 5 CAPSULE ORAL at 09:11

## 2018-05-04 RX ADMIN — LEVOTHYROXINE SODIUM 50 MCG: 50 TABLET ORAL at 05:28

## 2018-05-04 RX ADMIN — PRASUGREL HYDROCHLORIDE 10 MG: 10 TABLET, FILM COATED ORAL at 11:48

## 2018-05-04 NOTE — DISCHARGE SUMMARY
Discharge Summary - Aubree Douglas 74 y o  female MRN: [id number]    Unit/Bed#: CW2 214-01 Encounter: [id number]    Admission Date: 5/3/2018   Discharge Date:  5/4/2018    Disposition: Home      PCP: Dr Nigel Conde  Cardiologist:  Dr Hola Nunes  Interventional cardiologist: Dr Hola Nunes      Admitting Diagnosis: Dyspnea class 3    Discharge Diagnosis:  Coronary disease    Secondary Diagnoses:   Hypertension  Hyperlipidemia  Chronic diastolic heart failure  Morbid obesity/high BMI      Condition at Discharge: good   Consultants:  None  Procedures:  Cardiac catheterization and PCI and drug-eluting stent to mid RCA       /65 (BP Location: Left arm)   Pulse 78   Temp 97 5 °F (36 4 °C) (Oral)   Resp 20   Wt 136 kg (299 lb 13 2 oz)   SpO2 95%   BMI 48 39 kg/m²   Physical Exam   Constitutional: She is oriented to person, place, and time  She appears well-developed and well-nourished  HENT:   Head: Normocephalic and atraumatic  Neck: Neck supple  Cardiovascular: Normal rate, regular rhythm, normal heart sounds and intact distal pulses  Pulmonary/Chest: Effort normal and breath sounds normal    Abdominal: Soft  Bowel sounds are normal    Neurological: She is alert and oriented to person, place, and time  She has normal reflexes  Skin: Skin is warm and dry  Psychiatric: Her behavior is normal     Right Radial artery has  + 2 pulse with brisk capillary refill  Neurovascular intact to  Right hand  HPI and Hospital Course:  62-year-old female with history of coronary artery disease hypertension hyperlipidemia and morbid obesity was seen by Dr Lyubov Carlton office for continued dyspnea class 3  Patient was electively admitted for evaluation of ischemic burden  The results of the catheterization are as follows:      Native coronary lesions:  ·Proximal circumflex: Lesion 1: tubular, 30 % stenosis, site of prior stent  ·Mid RCA: Lesion 1: 90 % stenosis, site of prior stent    Intervention results  Native coronary lesions:  ·Successful drug-eluting stent and balloon angioplasty of the 90 % stenosis in mid RCA  Appearance improved with 40 % residual stenosis  Stent: Xience Alpine Rx 3 0 x 15mm drug-eluting  Stent: Xience Alpine Rx 3 25 x 12mm drug-eluting  Patient will require DAPT for 1 year ( aspirin 81 mg daily and prasugrel 10 mg daily)    Will follow up with Dr Petrona Gibson in the [facility] office on June 6th at 11:40    Discharge Medications:  See after visit summary for reconciled discharge medications provided to patient and family            Current Facility-Administered Medications   Medication Dose Route Frequency    amLODIPine (NORVASC) tablet 5 mg  5 mg Oral Daily    aspirin chewable tablet 81 mg  81 mg Oral Daily    atorvastatin (LIPITOR) tablet 80 mg  80 mg Oral Daily With Dinner    carvedilol (COREG) tablet 25 mg  25 mg Oral BID With Meals    fluticasone (FLOVENT HFA) 220 mcg/act inhaler 2 puff  2 puff Inhalation BID    furosemide (LASIX) tablet 20 mg  20 mg Oral Daily    levothyroxine tablet 50 mcg  50 mcg Oral Early Morning    prasugrel (EFFIENT) tablet 10 mg  10 mg Oral Daily    terazosin (HYTRIN) capsule 10 mg  10 mg Oral Daily    traMADol (ULTRAM) tablet 50 mg  50 mg Oral Q6H PRN       Pertinent Labs/diagnostics:        CBC with diff:   Results from last 7 days  Lab Units 05/04/18  0446 05/03/18  0723   WBC Thousand/uL 6 08 5 94   HEMOGLOBIN g/dL 9 8* 10 8*   HEMATOCRIT % 31 8* 34 3*   MCV fL 96 94   PLATELETS Thousands/uL 137* 157   MCH pg 29 4 29 4   MCHC g/dL 30 8* 31 5   RDW % 14 6 14 2   MPV fL 8 4* 8 2*   NRBC AUTO /100 WBCs  --  0         CMP:  Results from last 7 days  Lab Units 05/04/18  0446 05/03/18  0723   SODIUM mmol/L 140 139   POTASSIUM mmol/L 3 8 4 0   CHLORIDE mmol/L 107 106   CO2 mmol/L 27 28   ANION GAP mmol/L 6 5   BUN mg/dL 17 17   CREATININE mg/dL 0 87 0 86   GLUCOSE RANDOM mg/dL 128 135   CALCIUM mg/dL 8 6 9 2   EGFR ml/min/1 73sq m 70 71       Lipid Profile:   No results found for: CHOL  No results found for: HDL  No results found for: [facility]  Lab Results   Component Value Date    TRIG 100 04/17/2018           Tele:  Normal sinus rhythm      Discharge instructions/Information to patient and family:   See after visit summary for information provided to patient and family  Provisions for Follow-Up Care:  See after visit summary for information related to follow-up care and any pertinent home health orders  Planned Readmission: No    Discharge Statement:  I spent 45 minutes minutes discharging the patient  This time was spent on the day of discharge  I had direct contact with the patient on the day of discharge  Additional documentation is required if more than 30 minutes were spent on discharge  Nando Bowden, MSN, CCRN, CRNP  Acute Care Nurse Practitioner   [facility]    ** Please Note: Fluency Direct Dictation voice to text software may have been used in the creation of this document   **

## 2018-05-04 NOTE — SOCIAL WORK
Manuel Snyder is requesting to check pt's copay cost for Prasugrel  RX for Prasugrel received and sent to [address] [place], [place] [address]myla Silva to check pt's copay cost  CM will follow

## 2018-05-04 NOTE — SOCIAL WORK
CM received a call from [address] [place] [address] that pt's copay cost is $15/mo  CM informed pt and pt is agreeable for the copay cost for the said medication and is requesting to get meds from [facility]   CM also informed JULIA Mittal of the above  Pietro Davila stated that she will do an electronic transfer of rx to [facility]  Pt's daughter will transport pt home  Pt verbalized no other needs for CM at this time

## 2018-05-09 LAB
AMBIG ABBREV DEFAULT: NORMAL
BUN SERPL-MCNC: 17 MG/DL (ref 8–27)
BUN/CREAT SERPL: 20 (ref 12–28)
CALCIUM SERPL-MCNC: 9.4 MG/DL (ref 8.7–10.3)
CHLORIDE SERPL-SCNC: 100 MMOL/L (ref 96–106)
CO2 SERPL-SCNC: 25 MMOL/L (ref 18–29)
CREAT SERPL-MCNC: 0.85 MG/DL (ref 0.57–1)
GLUCOSE SERPL-MCNC: 149 MG/DL (ref 65–99)
POTASSIUM SERPL-SCNC: 4.4 MMOL/L (ref 3.5–5.2)
SL AMB EGFR AFRICAN AMERICAN: 83 ML/MIN/1.73
SL AMB EGFR NON AFRICAN AMERICAN: 72 ML/MIN/1.73
SODIUM SERPL-SCNC: 142 MMOL/L (ref 134–144)

## 2018-05-11 LAB
KCT BLD-ACNC: 284 SEC (ref 89–137)
SPECIMEN SOURCE: ABNORMAL

## 2018-06-06 ENCOUNTER — OFFICE VISIT (OUTPATIENT)
Dept: CARDIOLOGY CLINIC | Facility: CLINIC | Age: 68
End: 2018-06-06
Payer: COMMERCIAL

## 2018-06-06 VITALS
SYSTOLIC BLOOD PRESSURE: 132 MMHG | BODY MASS INDEX: 47.09 KG/M2 | HEIGHT: 66 IN | HEART RATE: 68 BPM | RESPIRATION RATE: 12 BRPM | DIASTOLIC BLOOD PRESSURE: 70 MMHG | WEIGHT: 293 LBS

## 2018-06-06 DIAGNOSIS — I25.10 CORONARY ARTERY DISEASE INVOLVING NATIVE CORONARY ARTERY OF NATIVE HEART WITHOUT ANGINA PECTORIS: ICD-10-CM

## 2018-06-06 DIAGNOSIS — I50.32 CHF (CONGESTIVE HEART FAILURE), NYHA CLASS I, CHRONIC, DIASTOLIC (HCC): Primary | ICD-10-CM

## 2018-06-06 DIAGNOSIS — E78.00 HYPERCHOLESTEROLEMIA: ICD-10-CM

## 2018-06-06 PROCEDURE — 99214 OFFICE O/P EST MOD 30 MIN: CPT | Performed by: INTERNAL MEDICINE

## 2018-06-06 RX ORDER — KETOTIFEN FUMARATE 0.35 MG/ML
1 SOLUTION/ DROPS OPHTHALMIC AS NEEDED
COMMUNITY

## 2018-06-06 RX ORDER — CETIRIZINE HYDROCHLORIDE 10 MG/1
10 TABLET ORAL DAILY PRN
COMMUNITY

## 2018-06-06 NOTE — PROGRESS NOTES
Cardiology Follow Up    Dariusz Zee  1950  [id number]  [address] [facility] Reinier Newman  [address] [place] [address]    1  CHF (congestive heart failure), NYHA class I, chronic, diastolic ([address] )     2  Coronary artery disease involving native coronary artery of native heart without angina pectoris     3  Hypercholesterolemia         Interval History:  Cardiology follow-up after recent revascularization  Patient felt significantly better, however she continues to experience dyspnea  She is not exercising much, she also complains of lower extremity edema  No orthopnea no PN D  States been compliant with her medications  Her most recent LDL is 57 on statin therapy  Compliant with low-sodium diet her blood pressures been well control as well  Patient Active Problem List   Diagnosis    CHF (congestive heart failure), NYHA class I, chronic, diastolic (HCC)    Coronary artery disease involving native coronary artery of native heart without angina pectoris    Hypercholesterolemia     No past medical history on file  Social History     Social History    Marital status: /Civil Union     Spouse name: N/A    Number of children: N/A    Years of education: N/A     Occupational History    Not on file  Social History Main Topics    Smoking status: Former Smoker    Smokeless tobacco: Never Used    Alcohol use Not on file    Drug use: Unknown    Sexual activity: Not on file     Other Topics Concern    Not on file     Social History Narrative    No narrative on file      No family history on file  No past surgical history on file      Current Outpatient Prescriptions:     amLODIPine (NORVASC) 5 mg tablet, Take 1 tablet by mouth daily, Disp: , Rfl:     aspirin 81 MG tablet, Take 1 tablet by mouth daily, Disp: , Rfl:     atorvastatin (LIPITOR) 80 mg tablet, Take 1 tablet by mouth, Disp: , Rfl:     carvedilol (COREG) 25 mg tablet, Take 1 tablet by mouth 2 (two) times a day, Disp: , Rfl:     cetirizine (ZyrTEC) 10 mg tablet, Take 10 mg by mouth daily, Disp: , Rfl:     ferrous sulfate 325 (65 Fe) mg tablet, Take 1 tablet by mouth Twice daily, Disp: , Rfl:     fluticasone (FLONASE) 50 mcg/act nasal spray, 2 sprays into each nostril daily as needed, Disp: , Rfl:     furosemide (LASIX) 20 mg tablet, 20 mg daily  , Disp: , Rfl:     glipiZIDE (GLUCOTROL) 5 mg tablet, Take 1 tablet by mouth 2 (two) times a day, Disp: , Rfl:     hydrochlorothiazide (HYDRODIURIL) 25 mg tablet, Take 1 tablet by mouth daily, Disp: , Rfl:     ketotifen (ZADITOR) 0 025 % ophthalmic solution, Administer 1 drop to both eyes as needed, Disp: , Rfl:     Lancets (ONETOUCH ULTRASOFT) lancets, by Does not apply route, Disp: , Rfl:     lansoprazole (PREVACID) 30 mg capsule, Take 1 capsule by mouth daily, Disp: , Rfl:     levothyroxine 50 mcg tablet, , Disp: , Rfl:     losartan (COZAAR) 100 MG tablet, Take 1 tablet by mouth daily, Disp: , Rfl:     metFORMIN (GLUCOPHAGE) 1000 MG tablet, Take 1 tablet by mouth 2 (two) times a day, Disp: , Rfl:     mometasone (ASMANEX 30 METERED DOSES) 220 MCG/INH inhaler, Inhale, Disp: , Rfl:     nitroglycerin (NITROSTAT) 0 4 mg SL tablet, Place 1 tablet (0 4 mg total) under the tongue every 5 (five) minutes as needed for chest pain, Disp: 90 tablet, Rfl: 3    potassium chloride (KLOR-CON M20) 20 mEq tablet, Take by mouth 2 (two) times a day  , Disp: , Rfl:     prasugrel (EFFIENT) tablet, Take 1 tablet (10 mg total) by mouth daily, Disp: 30 tablet, Rfl: 6    terazosin (HYTRIN) 5 mg capsule, Take 2 capsules by mouth daily, Disp: , Rfl:     traMADol (ULTRAM) 50 mg tablet, Take 1 tablet by mouth every 6 (six) hours as needed, Disp: , Rfl:   Allergies   Allergen Reactions    Penicillins Anaphylaxis       Labs:  Orders Only on 05/08/2018   Component Date Value    SL AMB GLUCOSE 05/08/2018 149*    BUN 05/08/2018 17     Creatinine, Serum 05/08/2018 0 85     eGFR Non  05/08/2018 72     SL AMB EGFR  AMER* 05/08/2018 83     SL AMB BUN/CREATININE RA* 05/08/2018 20     SL AMB SODIUM 05/08/2018 142     SL AMB POTASSIUM 05/08/2018 4 4     SL AMB CHLORIDE 05/08/2018 100     SL AMB CARBON DIOXIDE 05/08/2018 25     CALCIUM 05/08/2018 9 4     AMBIG BANDARV DEFAULT 05/08/2018 Comment    Admission on 05/03/2018, Discharged on 05/04/2018   Component Date Value    Sodium 05/03/2018 139     Potassium 05/03/2018 4 0     Chloride 05/03/2018 106     CO2 05/03/2018 28     Anion Gap 05/03/2018 5     BUN 05/03/2018 17     Creatinine 05/03/2018 0 86     Glucose 05/03/2018 135     Glucose, Fasting 05/03/2018 135*    Calcium 05/03/2018 9 2     eGFR 05/03/2018 71     WBC 05/03/2018 5 94     RBC 05/03/2018 3 67*    Hemoglobin 05/03/2018 10 8*    Hematocrit 05/03/2018 34 3*    MCV 05/03/2018 94     MCH 05/03/2018 29 4     MCHC 05/03/2018 31 5     RDW 05/03/2018 14 2     MPV 05/03/2018 8 2*    Platelets 80/07/4527 157     nRBC 05/03/2018 0     Neutrophils Relative 05/03/2018 70     Lymphocytes Relative 05/03/2018 22     Monocytes Relative 05/03/2018 6     Eosinophils Relative 05/03/2018 1     Basophils Relative 05/03/2018 1     Neutrophils Absolute 05/03/2018 4 14     Lymphocytes Absolute 05/03/2018 1 30     Monocytes Absolute 05/03/2018 0 37     Eosinophils Absolute 05/03/2018 0 08     Basophils Absolute 05/03/2018 0 03     Protime 05/03/2018 14 4     INR 05/03/2018 1 12     Ventricular Rate 05/03/2018 65     Atrial Rate 05/03/2018 65     PA Interval 05/03/2018 220     QRSD Interval 05/03/2018 116     QT Interval 05/03/2018 428     QTC Interval 05/03/2018 445     P Axis 05/03/2018 35     QRS Axis 05/03/2018 -32     T Wave [place] 05/03/2018 11     WBC 05/04/2018 6 08     RBC 05/04/2018 3 33*    Hemoglobin 05/04/2018 9 8*    Hematocrit 05/04/2018 31 8*    MCV 05/04/2018 96     MCH 05/04/2018 29 4     MCHC 05/04/2018 30 8*    RDW 05/04/2018 14 6     Platelets 38/61/0261 137*    MPV 05/04/2018 8 4*    Sodium 05/04/2018 140     Potassium 05/04/2018 3 8     Chloride 05/04/2018 107     CO2 05/04/2018 27     Anion Gap 05/04/2018 6     BUN 05/04/2018 17     Creatinine 05/04/2018 0 87     Glucose 05/04/2018 128     Calcium 05/04/2018 8 6     eGFR 05/04/2018 70     Magnesium 05/04/2018 2 0     POC Glucose 05/04/2018 120     Activated Clotting Time,* 05/03/2018 284*    Specimen Type 05/03/2018 VENOUS    Orders Only on 04/17/2018   Component Date Value    Cholesterol, Total 04/17/2018 125     Triglycerides 04/17/2018 100     SL AMB HDL CHOLESTEROL 04/17/2018 48     SL AMB LDL-CHOLESTEROL 04/17/2018 57      Imaging: No results found  Review of Systems:  Review of Systems   Constitutional: Positive for fatigue  Negative for activity change  HENT: Negative for nosebleeds  Eyes: Negative for visual disturbance  Respiratory: Positive for shortness of breath  Negative for apnea, cough, choking, chest tightness, wheezing and stridor  Cardiovascular: Positive for leg swelling  Negative for chest pain and palpitations  Gastrointestinal: Negative for abdominal pain and blood in stool  Endocrine: Negative for cold intolerance  Genitourinary: Negative for hematuria  Musculoskeletal: Positive for arthralgias  Skin: Negative for pallor and rash  Allergic/Immunologic: Negative for immunocompromised state  Neurological: Negative for dizziness, syncope and weakness  Hematological: Bruises/bleeds easily  Psychiatric/Behavioral: Negative for confusion  Physical Exam:  Physical Exam   Constitutional: She is oriented to person, place, and time  No distress  Neck: No JVD present  Cardiovascular: Normal rate  Exam reveals no gallop and no friction rub  Murmur (Soft systolic ejection murmur at the base) heard    Pulmonary/Chest: Effort normal and breath sounds normal  No respiratory distress  She has no wheezes  She has no rales  She exhibits no tenderness  Neurological: She is alert and oriented to person, place, and time  Skin: Skin is warm  She is not diaphoretic  Psychiatric: She has a normal mood and affect  Discussion/Summary:  Coronary disease, status post multiple revascularizations of the years  Starting 2002  Previous circumflex and RCA stent at that time  Plus more stents 4 years later  No details about that  Five years ago she was scheduled for a stent which was never done because of the incidental anemia  Stress test last year revealed a fixed inferior defect but no ischemia will manage medically, significant dyspnea possible angina equivalent  With partial improvement after most recent revascularization, cardiac catheterization revealed severe disease in the in stent restenosis of the RCA  Underwent PTCA very high pressures  Unable to fully expanded stent despite very high pressures 3 5 mm at 20 atmospheres with his residual 40% stenosis but improvement of the symptoms  She does have significant diastolic dysfunction and deconditioning  She did decline cardiac rehabilitation  Weight management recommended  Will increase the diuretic and decrease amlodipine to decreased lower extremity edema  We will repeat an echocardiogram as well  Possible vascular disease  The patient tells me that previously she was having carotid duplex regularly  As well as abdominal ultrasounds  Will repeat dose test prior clinically appears to be not significant

## 2018-06-25 ENCOUNTER — HOSPITAL ENCOUNTER (OUTPATIENT)
Dept: PULMONOLOGY | Facility: HOSPITAL | Age: 68
Discharge: HOME | End: 2018-06-25
Attending: INTERNAL MEDICINE
Payer: COMMERCIAL

## 2018-06-25 DIAGNOSIS — I50.32 CHF (CONGESTIVE HEART FAILURE), NYHA CLASS I, CHRONIC, DIASTOLIC (HCC): ICD-10-CM

## 2018-06-25 PROCEDURE — 94726 PLETHYSMOGRAPHY LUNG VOLUMES: CPT

## 2018-06-25 PROCEDURE — 94726 PLETHYSMOGRAPHY LUNG VOLUMES: CPT | Performed by: INTERNAL MEDICINE

## 2018-06-25 PROCEDURE — 94060 EVALUATION OF WHEEZING: CPT

## 2018-06-25 PROCEDURE — 94060 EVALUATION OF WHEEZING: CPT | Performed by: INTERNAL MEDICINE

## 2018-06-25 PROCEDURE — 94760 N-INVAS EAR/PLS OXIMETRY 1: CPT

## 2018-06-25 PROCEDURE — 94729 DIFFUSING CAPACITY: CPT

## 2018-06-25 PROCEDURE — 94729 DIFFUSING CAPACITY: CPT | Performed by: INTERNAL MEDICINE

## 2018-06-25 RX ORDER — ALBUTEROL SULFATE 2.5 MG/3ML
2.5 SOLUTION RESPIRATORY (INHALATION) EVERY 6 HOURS PRN
Status: DISCONTINUED | OUTPATIENT
Start: 2018-06-25 | End: 2018-06-29 | Stop reason: HOSPADM

## 2018-06-25 RX ADMIN — ALBUTEROL SULFATE 2.5 MG: 2.5 SOLUTION RESPIRATORY (INHALATION) at 14:40

## 2018-06-29 ENCOUNTER — HOSPITAL ENCOUNTER (OUTPATIENT)
Dept: NON INVASIVE DIAGNOSTICS | Facility: HOSPITAL | Age: 68
Discharge: HOME | End: 2018-06-29
Attending: INTERNAL MEDICINE
Payer: COMMERCIAL

## 2018-06-29 DIAGNOSIS — I25.10 CORONARY ARTERY DISEASE INVOLVING NATIVE CORONARY ARTERY OF NATIVE HEART WITHOUT ANGINA PECTORIS: ICD-10-CM

## 2018-06-29 DIAGNOSIS — I65.29 CAROTID STENOSIS, NON-SYMPTOMATIC, UNSPECIFIED LATERALITY: ICD-10-CM

## 2018-06-29 DIAGNOSIS — E78.00 HYPERCHOLESTEROLEMIA: ICD-10-CM

## 2018-06-29 DIAGNOSIS — I71.4 AAA (ABDOMINAL AORTIC ANEURYSM) WITHOUT RUPTURE (HCC): ICD-10-CM

## 2018-06-29 PROCEDURE — 93978 VASCULAR STUDY: CPT | Performed by: SURGERY

## 2018-06-29 PROCEDURE — 93880 EXTRACRANIAL BILAT STUDY: CPT | Performed by: SURGERY

## 2018-06-29 PROCEDURE — 93880 EXTRACRANIAL BILAT STUDY: CPT

## 2018-06-29 PROCEDURE — 93978 VASCULAR STUDY: CPT

## 2018-07-03 ENCOUNTER — HOSPITAL ENCOUNTER (OUTPATIENT)
Dept: NON INVASIVE DIAGNOSTICS | Facility: HOSPITAL | Age: 68
Discharge: HOME | End: 2018-07-03
Attending: INTERNAL MEDICINE
Payer: COMMERCIAL

## 2018-07-03 DIAGNOSIS — I50.32 CHF (CONGESTIVE HEART FAILURE), NYHA CLASS I, CHRONIC, DIASTOLIC (HCC): ICD-10-CM

## 2018-07-03 DIAGNOSIS — I25.10 CORONARY ARTERY DISEASE INVOLVING NATIVE CORONARY ARTERY OF NATIVE HEART WITHOUT ANGINA PECTORIS: ICD-10-CM

## 2018-07-03 PROCEDURE — 93306 TTE W/DOPPLER COMPLETE: CPT | Performed by: INTERNAL MEDICINE

## 2018-07-03 PROCEDURE — 93306 TTE W/DOPPLER COMPLETE: CPT

## 2018-07-10 LAB — HBA1C MFR BLD HPLC: 4.6 %

## 2018-10-16 ENCOUNTER — TELEPHONE (OUTPATIENT)
Dept: CARDIOLOGY CLINIC | Facility: CLINIC | Age: 68
End: 2018-10-16

## 2018-12-12 ENCOUNTER — OFFICE VISIT (OUTPATIENT)
Dept: CARDIOLOGY CLINIC | Facility: CLINIC | Age: 68
End: 2018-12-12
Payer: COMMERCIAL

## 2018-12-12 VITALS
DIASTOLIC BLOOD PRESSURE: 82 MMHG | BODY MASS INDEX: 47.09 KG/M2 | HEIGHT: 66 IN | HEART RATE: 80 BPM | WEIGHT: 293 LBS | SYSTOLIC BLOOD PRESSURE: 144 MMHG

## 2018-12-12 DIAGNOSIS — I50.32 CHF (CONGESTIVE HEART FAILURE), NYHA CLASS I, CHRONIC, DIASTOLIC (HCC): Primary | ICD-10-CM

## 2018-12-12 DIAGNOSIS — I71.4 ABDOMINAL AORTIC ANEURYSM (AAA) WITHOUT RUPTURE (HCC): ICD-10-CM

## 2018-12-12 DIAGNOSIS — E78.00 HYPERCHOLESTEROLEMIA: ICD-10-CM

## 2018-12-12 DIAGNOSIS — I65.23 CAROTID ARTERY STENOSIS, ASYMPTOMATIC, BILATERAL: ICD-10-CM

## 2018-12-12 DIAGNOSIS — I25.10 CORONARY ARTERY DISEASE INVOLVING NATIVE CORONARY ARTERY OF NATIVE HEART WITHOUT ANGINA PECTORIS: ICD-10-CM

## 2018-12-12 PROCEDURE — 99214 OFFICE O/P EST MOD 30 MIN: CPT | Performed by: INTERNAL MEDICINE

## 2018-12-12 RX ORDER — PRASUGREL 10 MG/1
10 TABLET, FILM COATED ORAL DAILY
Qty: 90 TABLET | Refills: 4 | Status: SHIPPED | OUTPATIENT
Start: 2018-12-12 | End: 2021-03-01 | Stop reason: ALTCHOICE

## 2018-12-12 NOTE — PROGRESS NOTES
Cardiology Follow Up    Evaristo Nur  1950  [id number]  [facility] [id number] [id number] [phone]    1  CHF (congestive heart failure), NYHA class I, chronic, diastolic ([facility] [place] [address] )     2  Coronary artery disease involving native coronary artery of native heart without angina pectoris     3  Hypercholesterolemia     4  Abdominal aortic aneurysm (AAA) without rupture (HCC)     5  Carotid artery stenosis, asymptomatic, bilateral         Interval History:   Cardiology follow-up  Patient continues to do well, denies any dyspnea, she is active not exercising regularly however  Compliant with low-cholesterol diet  Lipids recently check total cholesterol 125 with an LDL 57 excellent control on statin therapy high-intensity  Compliant low-sodium diet, her blood pressures been well control as well  Denies any focal neurological deficits, denies any amaurosis fugax  No orthopnea no PND  Patient Active Problem List   Diagnosis    CHF (congestive heart failure), NYHA class I, chronic, diastolic (HCC)    Coronary artery disease involving native coronary artery of native heart without angina pectoris    Hypercholesterolemia    Abdominal aortic aneurysm (AAA) without rupture ([facility] [place] [address] )    Carotid artery stenosis, asymptomatic, bilateral     Past Medical History:   Diagnosis Date    AAA (abdominal aortic aneurysm) (HCC)     Anemia     Coronary artery disease     Diabetes mellitus ([facility] [place] [address] )     Disease of thyroid gland     GERD (gastroesophageal reflux disease)     Hypertension     [facility]     Myocardial infarction ([facility] )     Sleep apnea      Social History     Social History    Marital status: /Civil Union     Spouse name: N/A    Number of children: N/A    Years of education: N/A     Occupational History    Not on file       Social History Main Topics    Smoking status: Former Smoker     Packs/day: 1 50     Years: 30 00     Quit date: 2001    Smokeless tobacco: Never Used    Alcohol use 0 6 oz/week     1 Glasses of wine per week      Comment: rare-2 x/ year    Drug use: No    Sexual activity: Not on file     Other Topics Concern    Not on file     Social History Narrative    No narrative on file      Family History   Problem Relation Age of Onset    Cancer Mother     Heart disease Father     Diabetes Father     Heart disease Sister     Heart disease Brother      Past Surgical History:   Procedure Laterality Date    CARDIAC SURGERY      cardiac stents x 8    COLONOSCOPY      colon polyps    HERNIA REPAIR      umb x 2    TUBAL LIGATION         Current Outpatient Prescriptions:     amLODIPine (NORVASC) 5 mg tablet, Take 1 tablet by mouth daily  , Disp: , Rfl:     aspirin 81 MG tablet, Take 1 tablet by mouth daily, Disp: , Rfl:     atorvastatin (LIPITOR) 80 mg tablet, Take 1 tablet by mouth, Disp: , Rfl:     carvedilol (COREG) 25 mg tablet, Take 1 tablet by mouth 2 (two) times a day, Disp: , Rfl:     cetirizine (ZyrTEC) 10 mg tablet, Take 10 mg by mouth daily as needed  , Disp: , Rfl:     ferrous sulfate 325 (65 Fe) mg tablet, Take 1 tablet by mouth daily with breakfast  , Disp: , Rfl:     fluticasone (FLONASE) 50 mcg/act nasal spray, 2 sprays into each nostril daily as needed, Disp: , Rfl:     furosemide (LASIX) 20 mg tablet, Take 40 mg by mouth daily as needed  , Disp: , Rfl:     glipiZIDE (GLUCOTROL) 5 mg tablet, Take 1 tablet by mouth 2 (two) times a day, Disp: , Rfl:     ketotifen (ZADITOR) 0 025 % ophthalmic solution, Administer 1 drop to both eyes as needed, Disp: , Rfl:     Lancets (ONETOUCH ULTRASOFT) lancets, by Does not apply route, Disp: , Rfl:     lansoprazole (PREVACID) 30 mg capsule, Take 1 capsule by mouth daily, Disp: , Rfl:     levothyroxine 50 mcg tablet, , Disp: , Rfl:     losartan (COZAAR) 100 MG tablet, Take 1 tablet by mouth daily, Disp: , Rfl:     metFORMIN (GLUCOPHAGE) 1000 MG tablet, Take 1 tablet by mouth 2 (two) times a day, Disp: , Rfl:     mometasone (ASMANEX 30 METERED DOSES) 220 MCG/INH inhaler, Inhale as needed  , Disp: , Rfl:     potassium chloride (KLOR-CON M20) 20 mEq tablet, Take by mouth 2 (two) times a day as needed  , Disp: , Rfl:     prasugrel (EFFIENT) tablet, Take 1 tablet (10 mg total) by mouth daily, Disp: 30 tablet, Rfl: 6    terazosin (HYTRIN) 5 mg capsule, Take 2 capsules by mouth daily, Disp: , Rfl:     traMADol (ULTRAM) 50 mg tablet, Take 1 tablet by mouth every 6 (six) hours as needed, Disp: , Rfl:     nitroglycerin (NITROSTAT) 0 4 mg SL tablet, Place 1 tablet (0 4 mg total) under the tongue every 5 (five) minutes as needed for chest pain (Patient not taking: Reported on 12/12/2018 ), Disp: 90 tablet, Rfl: 3  Allergies   Allergen Reactions    Penicillins Anaphylaxis       Labs:  No visits with results within 6 Month(s) from this visit  Latest known visit with results is:   Orders Only on 05/08/2018   Component Date Value    Glucose, Random 05/08/2018 149*    BUN 05/08/2018 17     Creatinine 05/08/2018 0 85     eGFR Non  05/08/2018 72     SL AMB EGFR  AMER* 05/08/2018 83     SL AMB BUN/CREATININE RA* 05/08/2018 20     Sodium 05/08/2018 142     Potassium 05/08/2018 4 4     Chloride 05/08/2018 100     CO2 05/08/2018 25     CALCIUM 05/08/2018 9 4     AMBIG ABBREV DEFAULT 05/08/2018 Comment      Imaging: No results found  Review of Systems:  Review of Systems   Constitutional: Negative for activity change, fatigue and unexpected weight change  HENT: Negative for hearing loss and nosebleeds  Eyes: Negative for visual disturbance  Respiratory: Negative for apnea, shortness of breath, wheezing and stridor  Cardiovascular: Negative for chest pain, palpitations and leg swelling  Gastrointestinal: Negative for abdominal pain and blood in stool     Endocrine: Negative for cold intolerance  Genitourinary: Negative for hematuria  Musculoskeletal: Positive for arthralgias  Negative for myalgias  Skin: Negative for pallor  Allergic/Immunologic: Negative for immunocompromised state  Neurological: Negative for dizziness, syncope and weakness  Hematological: Bruises/bleeds easily  Psychiatric/Behavioral: Negative for sleep disturbance  Physical Exam:  Physical Exam   Constitutional: She is oriented to person, place, and time  She appears well-developed  No distress (Obese)  Eyes: No scleral icterus  Neck: No JVD present  Cardiovascular: Normal rate, regular rhythm and intact distal pulses  Exam reveals no gallop and no friction rub  No murmur heard  Distant heart sounds   Pulmonary/Chest: Effort normal and breath sounds normal  No respiratory distress  She has no wheezes  She has no rales  Neurological: She is alert and oriented to person, place, and time  Skin: Skin is warm and dry  She is not diaphoretic  Psychiatric: She has a normal mood and affect  Discussion/Summary:  Coronary artery disease complex revascularization on 5/18  PTCA/drug stent of the RCA for in stent restenosis  She does have 2 layers stent at the area of restenosis, with difficulty fully expanding the stent  Despite very high pressures  Residual 40% stenosis, no clinical evidence of restenosis, her angina equivalent was dyspnea  Given the above I favor prolonged dual antiplatelet therapy  Will continue prasugrel for another year and basically continuously unless there is a issues with bleeding  Left ventricular systolic function is normal  Echocardiogram revealed diastolic dysfunction with left ventricular hypertrophy and mild-to-moderate tricuspid insufficiency with mildly increased pulmonary artery pressures suggested by Doppler criteria TR velocity of 2 9 m/sec    She does have history of smoking heavily having quit over 2 decades ago however, pulmonary function test revealed mostly restrictive pattern likely related to her size  FEV1 68% and decreased DLCO but normal when corrected for hemoglobin  I offered pulmonary evaluation, she did decline  Regular exercise and weight management recommended  Peripheral vascular disease, carotid this year revealed 50-69% on the right with a 50% the left, vascular abdominal study revealed an infrarenal AAA of 3 cm  And diffuse disease in the iliac system  Nonobstructive continue dual antiplatelet therapy and statin therapy    As well as blood pressure control

## 2018-12-20 ENCOUNTER — TELEPHONE (OUTPATIENT)
Dept: CARDIOLOGY CLINIC | Facility: CLINIC | Age: 68
End: 2018-12-20

## 2018-12-26 NOTE — PRE-PROCEDURE INSTRUCTIONS
Pre-Surgery Instructions:   Medication Instructions    amLODIPine (NORVASC) 5 mg tablet Instructed patient per Anesthesia Guidelines   aspirin 81 MG tablet Patient was instructed by Physician and understands   atorvastatin (LIPITOR) 80 mg tablet Instructed patient per Anesthesia Guidelines   carvedilol (COREG) 25 mg tablet Instructed patient per Anesthesia Guidelines   cetirizine (ZyrTEC) 10 mg tablet Instructed patient per Anesthesia Guidelines   fluticasone (FLONASE) 50 mcg/act nasal spray Instructed patient per Anesthesia Guidelines   furosemide (LASIX) 20 mg tablet Instructed patient per Anesthesia Guidelines   glipiZIDE (GLUCOTROL) 5 mg tablet Instructed patient per Anesthesia Guidelines   lansoprazole (PREVACID) 30 mg capsule Instructed patient per Anesthesia Guidelines   levothyroxine 50 mcg tablet Instructed patient per Anesthesia Guidelines   losartan (COZAAR) 100 MG tablet Instructed patient per Anesthesia Guidelines   metFORMIN (GLUCOPHAGE) 1000 MG tablet Instructed patient per Anesthesia Guidelines   nitroglycerin (NITROSTAT) 0 4 mg SL tablet Instructed patient per Anesthesia Guidelines   potassium chloride (KLOR-CON M20) 20 mEq tablet Instructed patient per Anesthesia Guidelines   terazosin (HYTRIN) 5 mg capsule Instructed patient per Anesthesia Guidelines   traMADol (ULTRAM) 50 mg tablet Instructed patient per Anesthesia Guidelines  Standard GI pre-procedure instructions given without any further questions or concerns at this time  Patient reports she has a ride home post-procedure

## 2019-01-02 ENCOUNTER — HOSPITAL ENCOUNTER (OUTPATIENT)
Facility: HOSPITAL | Age: 69
Setting detail: OUTPATIENT SURGERY
Discharge: HOME | End: 2019-01-02
Attending: INTERNAL MEDICINE | Admitting: INTERNAL MEDICINE
Payer: COMMERCIAL

## 2019-01-02 ENCOUNTER — ANESTHESIA EVENT (OUTPATIENT)
Dept: PERIOP | Facility: HOSPITAL | Age: 69
End: 2019-01-02
Payer: COMMERCIAL

## 2019-01-02 ENCOUNTER — ANESTHESIA (OUTPATIENT)
Dept: PERIOP | Facility: HOSPITAL | Age: 69
End: 2019-01-02
Payer: COMMERCIAL

## 2019-01-02 VITALS
OXYGEN SATURATION: 97 % | HEART RATE: 79 BPM | RESPIRATION RATE: 18 BRPM | TEMPERATURE: 97.9 F | BODY MASS INDEX: 46.54 KG/M2 | DIASTOLIC BLOOD PRESSURE: 72 MMHG | SYSTOLIC BLOOD PRESSURE: 166 MMHG | HEIGHT: 66 IN | WEIGHT: 289.6 LBS

## 2019-01-02 DIAGNOSIS — D50.0 IRON DEFICIENCY ANEMIA DUE TO CHRONIC BLOOD LOSS: ICD-10-CM

## 2019-01-02 LAB — GLUCOSE SERPL-MCNC: 126 MG/DL (ref 65–140)

## 2019-01-02 PROCEDURE — 82948 REAGENT STRIP/BLOOD GLUCOSE: CPT

## 2019-01-02 PROCEDURE — 88305 TISSUE EXAM BY PATHOLOGIST: CPT | Performed by: PATHOLOGY

## 2019-01-02 RX ORDER — SODIUM CHLORIDE 9 MG/ML
20 INJECTION, SOLUTION INTRAVENOUS CONTINUOUS
Status: DISCONTINUED | OUTPATIENT
Start: 2019-01-02 | End: 2019-01-02 | Stop reason: HOSPADM

## 2019-01-02 RX ORDER — PROPOFOL 10 MG/ML
INJECTION, EMULSION INTRAVENOUS AS NEEDED
Status: DISCONTINUED | OUTPATIENT
Start: 2019-01-02 | End: 2019-01-02 | Stop reason: SURG

## 2019-01-02 RX ADMIN — PROPOFOL 30 MG: 10 INJECTION, EMULSION INTRAVENOUS at 10:26

## 2019-01-02 RX ADMIN — PROPOFOL 30 MG: 10 INJECTION, EMULSION INTRAVENOUS at 10:11

## 2019-01-02 RX ADMIN — PROPOFOL 20 MG: 10 INJECTION, EMULSION INTRAVENOUS at 10:29

## 2019-01-02 RX ADMIN — PROPOFOL 30 MG: 10 INJECTION, EMULSION INTRAVENOUS at 10:28

## 2019-01-02 RX ADMIN — PROPOFOL 40 MG: 10 INJECTION, EMULSION INTRAVENOUS at 10:15

## 2019-01-02 RX ADMIN — PROPOFOL 30 MG: 10 INJECTION, EMULSION INTRAVENOUS at 10:23

## 2019-01-02 RX ADMIN — PROPOFOL 30 MG: 10 INJECTION, EMULSION INTRAVENOUS at 10:20

## 2019-01-02 RX ADMIN — PROPOFOL 40 MG: 10 INJECTION, EMULSION INTRAVENOUS at 10:12

## 2019-01-02 RX ADMIN — PROPOFOL 30 MG: 10 INJECTION, EMULSION INTRAVENOUS at 10:25

## 2019-01-02 RX ADMIN — PROPOFOL 30 MG: 10 INJECTION, EMULSION INTRAVENOUS at 10:24

## 2019-01-02 RX ADMIN — PROPOFOL 30 MG: 10 INJECTION, EMULSION INTRAVENOUS at 10:19

## 2019-01-02 RX ADMIN — PROPOFOL 40 MG: 10 INJECTION, EMULSION INTRAVENOUS at 10:14

## 2019-01-02 RX ADMIN — PROPOFOL 40 MG: 10 INJECTION, EMULSION INTRAVENOUS at 10:18

## 2019-01-02 RX ADMIN — PROPOFOL 20 MG: 10 INJECTION, EMULSION INTRAVENOUS at 10:10

## 2019-01-02 RX ADMIN — PROPOFOL 30 MG: 10 INJECTION, EMULSION INTRAVENOUS at 10:22

## 2019-01-02 RX ADMIN — PROPOFOL 30 MG: 10 INJECTION, EMULSION INTRAVENOUS at 10:21

## 2019-01-02 RX ADMIN — PROPOFOL 30 MG: 10 INJECTION, EMULSION INTRAVENOUS at 10:13

## 2019-01-02 RX ADMIN — SODIUM CHLORIDE 20 ML/HR: 0.9 INJECTION, SOLUTION INTRAVENOUS at 09:04

## 2019-01-02 NOTE — DISCHARGE INSTR - AVS FIRST PAGE
Lyric Zacarias ESOPHAGOGASTRODUODENOSCOPY(EGD) and COLONOSCOPY    SEDATION: Monitored anesthesia care, check anesthesia records    ASA Class: 3    INDICATIONS: Iron deficiency anemia     CONSENT:  Informed consent was obtained for the procedure, including sedation after explaining the risks and benefits of the procedure  Risks including but not limited to bleeding, perforation, infection, and missed lesion  PREPARATION:   Telemetry, pulse oximetry, blood pressure were monitored throughout the procedure  Patient was identified by myself both verbally and by visual inspection of ID band  PROCEDURE: EGD and biopsy     DESCRIPTION:   Patient was placed in the left lateral decubitus position and was sedated with the above medication  The gastroscope was introduced in to the oropharynx and the esophagus was intubated under direct visualization  Scope was passed down the esophagus up to 2nd part of the duodenum  A careful inspection was made as the gastroscope was withdrawn, including a retroflexed view of the stomach; findings and interventions are described below  SPECIMENS TAKEN:    Duodenum 2 and bulb - rule out celiac  Gastric body - sample of 3 polyps     ESTIMATED BLOOD LOSS:      Minimal        FINDINGS:    #1  Esophagus- Small sliding hiatal hernia  2 cm     #2  Stomach- Multiple polyps ( over 20 ) size 4 mm to 8 mm - largest 3 biopsied    #3  Duodenum- Normal bulb and d-2            PROCEDURE: COLONOSCOPY    DESCRIPTION:     Prior colonoscopy: 5 years ago  Informed consent was obtained for the procedure, including sedation  Risks of perforation, hemorrhage, adverse drug reaction and aspiration were discussed  The patient was placed in the left lateral decubitus position    Based on the pre-procedure assessment, including review of the patient's medical history, medications, allergies, and review of systems, she had been deemed to be an appropriate candidate for conscious sedation; she was therefore sedated with the medications listed below  The patient was monitored continuously with telemetry, pulse oximetry, blood pressure monitoring, and direct observations  A rectal examination was performed  The colonoscope was inserted into the rectum and advanced under direct vision to the terminal ileum  The quality of the colonic preparation was good  A careful inspection was made as the colonoscope was withdrawn, including a retroflexed view of the rectum; findings and interventions are described below  SPECIMENS TAKEN:    None     ESTIMATED BLOOD LOSS:    None       FINDINGS:  Moderate diverticulosis sigmoid and descending colon   Grade 1 internal hemorrhoids            COMPLICATIONS:   None; patient tolerated the procedure well  IMPRESSION:    See post op diagnosis     RECOMMENDATIONS:  Repeat colonoscopy in 5 years or sooner if clinically indicated  Repeat colonoscopy is being recommended at an interval of less than 10 years, this is because of           DISPOSITION: PACU           CONDITION: Stable    SIGNATURE: Kathleen Callejas MD  DATE: January 2, 2019  TIME: 10:45 AM ESOPHAGOGASTRODUODENOSCOPY(EGD) and COLONOSCOPY    SEDATION: Monitored anesthesia care, check anesthesia records    ASA Class: 3    INDICATIONS:Iron deficiency anemia     CONSENT:  Informed consent was obtained for the procedure, including sedation after explaining the risks and benefits of the procedure  Risks including but not limited to bleeding, perforation, infection, and missed lesion  PREPARATION:   Telemetry, pulse oximetry, blood pressure were monitored throughout the procedure  Patient was identified by myself both verbally and by visual inspection of ID band  PROCEDURE: EGD    DESCRIPTION:   Patient was placed in the left lateral decubitus position and was sedated with the above medication  The gastroscope was introduced in to the oropharynx and the esophagus was intubated under direct visualization   Scope was passed down the esophagus up to 2nd part of the duodenum  A careful inspection was made as the gastroscope was withdrawn, including a retroflexed view of the stomach; findings and interventions are described below  SPECIMENS TAKEN:      Small bowel   Gastric polyps     ESTIMATED BLOOD LOSS:      Minimal       FINDINGS:    #1  Esophagus- small hiatal hernia     #2  Stomach- multiple gastric polyps     #3  Duodenum- normal to d-2             PROCEDURE: COLONOSCOPY    DESCRIPTION:     Prior colonoscopy: 5 years ago  Informed consent was obtained for the procedure, including sedation  Risks of perforation, hemorrhage, adverse drug reaction and aspiration were discussed  The patient was placed in the left lateral decubitus position  Based on the pre-procedure assessment, including review of the patient's medical history, medications, allergies, and review of systems, she had been deemed to be an appropriate candidate for conscious sedation; she was therefore sedated with the medications listed below  The patient was monitored continuously with telemetry, pulse oximetry, blood pressure monitoring, and direct observations  A rectal examination was performed  The pediatric colonoscope was inserted into the rectum and advanced under direct vision to the terminal ileum  The quality of the colonic preparation was good  A careful inspection was made as the colonoscope was withdrawn, including a retroflexed view of the rectum; findings and interventions are described below  SPECIMENS TAKEN:      None     ESTIMATED BLOOD LOSS:      None      FINDINGS:  Moderate Diverticulosis   Small internal hemorrhoids            COMPLICATIONS:   None; patient tolerated the procedure well  IMPRESSION:    See finding     RECOMMENDATIONS:  Repeat colonoscopy in 5 years or sooner if clinically indicated      Repeat colonoscopy is being recommended at an interval of less than 10 years, this is because of a personal history of polyps or colon cancer            DISPOSITION: PACU           CONDITION: Stable    SIGNATURE: Sera Romero MD  DATE: January 2, 2019  TIME: 10:45 AM

## 2019-01-02 NOTE — ANESTHESIA PREPROCEDURE EVALUATION
Review of Systems/Medical History          Cardiovascular  Hyperlipidemia, Hypertension , Past MI > 6 months, CAD , CHF ,    Pulmonary  Sleep apnea CPAP,        GI/Hepatic    GERD ,             Endo/Other  Diabetes , History of thyroid disease , hypothyroidism,      GYN       Hematology  Anemia ,     Musculoskeletal    Comment: BMI 48      Neurology   Psychology           Physical Exam    Airway    Mallampati score: II         Dental       Cardiovascular  Rhythm: regular, Rate: normal,     Pulmonary  Breath sounds clear to auscultation,     Other Findings        Anesthesia Plan  ASA Score- 3     Anesthesia Type- IV sedation with anesthesia with ASA Monitors  Additional Monitors:   Airway Plan:         Plan Factors-    Induction- intravenous  Postoperative Plan-     Informed Consent- Anesthetic plan and risks discussed with patient  I personally reviewed this patient with the CRNA  Discussed and agreed on the Anesthesia Plan with the CRNA  Cristiano Arambula

## 2019-01-02 NOTE — OP NOTE
ESOPHAGOGASTRODUODENOSCOPY(EGD) and COLONOSCOPY    SEDATION: Monitored anesthesia care, check anesthesia records    ASA Class: 3    INDICATIONS: Iron deficiency anemia     CONSENT:  Informed consent was obtained for the procedure, including sedation after explaining the risks and benefits of the procedure  Risks including but not limited to bleeding, perforation, infection, and missed lesion  PREPARATION:   Telemetry, pulse oximetry, blood pressure were monitored throughout the procedure  Patient was identified by myself both verbally and by visual inspection of ID band  PROCEDURE: EGD and biopsy     DESCRIPTION:   Patient was placed in the left lateral decubitus position and was sedated with the above medication  The gastroscope was introduced in to the oropharynx and the esophagus was intubated under direct visualization  Scope was passed down the esophagus up to 2nd part of the duodenum  A careful inspection was made as the gastroscope was withdrawn, including a retroflexed view of the stomach; findings and interventions are described below  SPECIMENS TAKEN:    Duodenum 2 and bulb - rule out celiac  Gastric body - sample of 3 polyps     ESTIMATED BLOOD LOSS:      Minimal        FINDINGS:    #1  Esophagus- Small sliding hiatal hernia  2 cm     #2  Stomach- Multiple polyps ( over 20 ) size 4 mm to 8 mm - largest 3 biopsied    #3  Duodenum- Normal bulb and d-2            PROCEDURE: COLONOSCOPY    DESCRIPTION:     Prior colonoscopy: 5 years ago  Informed consent was obtained for the procedure, including sedation  Risks of perforation, hemorrhage, adverse drug reaction and aspiration were discussed  The patient was placed in the left lateral decubitus position    Based on the pre-procedure assessment, including review of the patient's medical history, medications, allergies, and review of systems, she had been deemed to be an appropriate candidate for conscious sedation; she was therefore sedated with the medications listed below  The patient was monitored continuously with telemetry, pulse oximetry, blood pressure monitoring, and direct observations  A rectal examination was performed  The colonoscope was inserted into the rectum and advanced under direct vision to the terminal ileum  The quality of the colonic preparation was good  A careful inspection was made as the colonoscope was withdrawn, including a retroflexed view of the rectum; findings and interventions are described below  SPECIMENS TAKEN:    None     ESTIMATED BLOOD LOSS:    None       FINDINGS:  Moderate diverticulosis sigmoid and descending colon   Grade 1 internal hemorrhoids            COMPLICATIONS:   None; patient tolerated the procedure well  IMPRESSION:    See post op diagnosis     RECOMMENDATIONS:  Repeat colonoscopy in 5 years or sooner if clinically indicated  Repeat colonoscopy is being recommended at an interval of less than 10 years, this is because of           DISPOSITION: PACU           CONDITION: Stable    SIGNATURE: Maya Otto MD  DATE: January 2, 2019  TIME: 10:45 AM ESOPHAGOGASTRODUODENOSCOPY(EGD) and COLONOSCOPY    SEDATION: Monitored anesthesia care, check anesthesia records    ASA Class: 3    INDICATIONS:Iron deficiency anemia     CONSENT:  Informed consent was obtained for the procedure, including sedation after explaining the risks and benefits of the procedure  Risks including but not limited to bleeding, perforation, infection, and missed lesion  PREPARATION:   Telemetry, pulse oximetry, blood pressure were monitored throughout the procedure  Patient was identified by myself both verbally and by visual inspection of ID band  PROCEDURE: EGD    DESCRIPTION:   Patient was placed in the left lateral decubitus position and was sedated with the above medication  The gastroscope was introduced in to the oropharynx and the esophagus was intubated under direct visualization   Scope was passed down the esophagus up to 2nd part of the duodenum  A careful inspection was made as the gastroscope was withdrawn, including a retroflexed view of the stomach; findings and interventions are described below  SPECIMENS TAKEN:      Small bowel   Gastric polyps     ESTIMATED BLOOD LOSS:      Minimal       FINDINGS:    #1  Esophagus- small hiatal hernia     #2  Stomach- multiple gastric polyps     #3  Duodenum- normal to d-2             PROCEDURE: COLONOSCOPY    DESCRIPTION:     Prior colonoscopy: 5 years ago  Informed consent was obtained for the procedure, including sedation  Risks of perforation, hemorrhage, adverse drug reaction and aspiration were discussed  The patient was placed in the left lateral decubitus position  Based on the pre-procedure assessment, including review of the patient's medical history, medications, allergies, and review of systems, she had been deemed to be an appropriate candidate for conscious sedation; she was therefore sedated with the medications listed below  The patient was monitored continuously with telemetry, pulse oximetry, blood pressure monitoring, and direct observations  A rectal examination was performed  The pediatric colonoscope was inserted into the rectum and advanced under direct vision to the terminal ileum  The quality of the colonic preparation was good  A careful inspection was made as the colonoscope was withdrawn, including a retroflexed view of the rectum; findings and interventions are described below  SPECIMENS TAKEN:      None     ESTIMATED BLOOD LOSS:      None      FINDINGS:  Moderate Diverticulosis   Small internal hemorrhoids            COMPLICATIONS:   None; patient tolerated the procedure well  IMPRESSION:    See finding     RECOMMENDATIONS:  Repeat colonoscopy in 5 years or sooner if clinically indicated      Repeat colonoscopy is being recommended at an interval of less than 10 years, this is because of a personal history of polyps or colon cancer            DISPOSITION: PACU           CONDITION: Stable    SIGNATURE: Britany Tomlinson MD  DATE: January 2, 2019  TIME: 10:45 AM

## 2019-01-02 NOTE — DISCHARGE INSTRUCTIONS
Marilyn Li      Dear Patient,    If specimens were collected, the office will call you with the pathology results within 2 weeks  It is very important that you follow these instructions:    Because you received intravenous sedation for your exam, you must return home and   · REST TODAY - DO NOT STAY ALONE  · DO NOT operate machinery for 24 hours   · DO NOT drink alcohol for 24 hours  · DO NOT drive for 24 hours  · DO NOT return to work until tomorrow   · DO NOT sign important paperwork for 24 hours    ·  If the site where your IV was placed is painful, place warm wet  compresses on the site until the soreness is relieved  Call us if there  is no improvement within 24 hours  · Unless otherwise instructed, you may resume your regular diet,  Start by taking  crackers and clear liquids  If no nausea or vomiting, you may advance your diet as tolerated  · Call your physician at [phone] if you develop any of the following symptoms:  · NEW OR INCREASED BLEEDING  · NEW ABDOMINAL DISTENTION (SWELLING)  · INCREASING NAUSEA, VOMITING OR PAIN  · FEVER/CHILLS  · BLACK/BLOODY STOOLS    If you are unable to contact the the doctor or your primary physician and you are having a severe complication, go to the nearest emergency room or call 911  Call [facility] at [phone] if you have any problems or questions related to your procedure  NOTE: After normal office hours (Monday-Friday 9:00 a m  to 4:00 p m ), the answering service will answer the phone, take a message, and they will contact the on call physician to call you

## 2019-03-23 ENCOUNTER — TELEPHONE (OUTPATIENT)
Dept: GASTROENTEROLOGY | Facility: CLINIC | Age: 69
End: 2019-03-23

## 2019-03-27 NOTE — TELEPHONE ENCOUNTER
I called patient today to let her know Dr Ricardo Holley was recommending a capsule endoscopy after reviewing hematology notes  Patient states she had one done years ago and it was negative as were her EGD/colon at that time as well  States since the EGD/colon this time are also negative for bleeding she will not do the capsule endoscopy  She feels it is not necessary

## 2019-04-03 NOTE — TELEPHONE ENCOUNTER
I returned the patient's call and strongly encouraged her to schedule the capsule study period with unclear as to the reason for her anemia  Negative EGD and colonoscopy in early January  The patient in the interim is needed IV iron 2 units of packed red blood cells  I told her she must be losing blood from somewhere  She remains noncommittal   Sathe 6 she will think about scheduling the capsule study

## 2019-06-03 ENCOUNTER — OFFICE VISIT (OUTPATIENT)
Dept: PODIATRY | Facility: CLINIC | Age: 69
End: 2019-06-03
Payer: COMMERCIAL

## 2019-06-03 DIAGNOSIS — L85.1 ACQUIRED KERATODERMA: ICD-10-CM

## 2019-06-03 DIAGNOSIS — E11.40 TYPE 2 DIABETES MELLITUS WITH DIABETIC NEUROPATHY, WITHOUT LONG-TERM CURRENT USE OF INSULIN (HCC): Primary | ICD-10-CM

## 2019-06-03 DIAGNOSIS — B35.1 ONYCHOMYCOSIS: ICD-10-CM

## 2019-06-03 DIAGNOSIS — I73.9 PERIPHERAL VASCULAR DISEASE, UNSPECIFIED (HCC): ICD-10-CM

## 2019-06-03 PROCEDURE — 11055 PARING/CUTG B9 HYPRKER LES 1: CPT | Performed by: PODIATRIST

## 2019-06-03 PROCEDURE — 11721 DEBRIDE NAIL 6 OR MORE: CPT | Performed by: PODIATRIST

## 2019-08-17 DIAGNOSIS — Z12.11 ENCOUNTER FOR SCREENING COLONOSCOPY: Primary | ICD-10-CM

## 2019-09-04 NOTE — TELEPHONE ENCOUNTER
Spoke to [facility]  Capsule does not require precert  Reference # [id number]  9/4/19 at 1:14 pm

## 2019-09-05 NOTE — TELEPHONE ENCOUNTER
Patient advised to call insurance re: capsule  Will email instructions to  [email or web address]    Please sign/send prep in AIRAM's absence  Thanks

## 2019-09-05 NOTE — TELEPHONE ENCOUNTER
Pt left VM [facility] asking for [phone] in reference to camera test Dr Jaquelin Knight wants her to have

## 2019-09-06 NOTE — TELEPHONE ENCOUNTER
Ashley Majano from [address] [place] [address] called with patient on the line  She wanted to confirm status of Capsule Endoscopy PA   I advised that Elisa Muniz spoke to [facility]  Capsule does not require precert  Reference # [id number]  9/4/19 at 1:14 pm

## 2019-09-09 ENCOUNTER — OFFICE VISIT (OUTPATIENT)
Dept: PODIATRY | Facility: CLINIC | Age: 69
End: 2019-09-09
Payer: COMMERCIAL

## 2019-09-09 VITALS
DIASTOLIC BLOOD PRESSURE: 78 MMHG | HEIGHT: 66 IN | SYSTOLIC BLOOD PRESSURE: 140 MMHG | WEIGHT: 289 LBS | BODY MASS INDEX: 46.45 KG/M2

## 2019-09-09 DIAGNOSIS — L85.1 ACQUIRED KERATODERMA: ICD-10-CM

## 2019-09-09 DIAGNOSIS — I73.9 PERIPHERAL VASCULAR DISEASE, UNSPECIFIED (HCC): ICD-10-CM

## 2019-09-09 DIAGNOSIS — B35.1 ONYCHOMYCOSIS: Primary | ICD-10-CM

## 2019-09-09 DIAGNOSIS — E11.40 TYPE 2 DIABETES MELLITUS WITH DIABETIC NEUROPATHY, WITHOUT LONG-TERM CURRENT USE OF INSULIN (HCC): ICD-10-CM

## 2019-09-09 PROCEDURE — 11055 PARING/CUTG B9 HYPRKER LES 1: CPT | Performed by: PODIATRIST

## 2019-09-09 PROCEDURE — 11721 DEBRIDE NAIL 6 OR MORE: CPT | Performed by: PODIATRIST

## 2019-09-09 NOTE — PROGRESS NOTES
PATIENT:  Nicholas Avendano  1950    ASSESSMENT/PLAN:  1  Onychomycosis     2  Type 2 diabetes mellitus with diabetic neuropathy, without long-term current use of insulin ([facility] )     3  Acquired keratoderma     4  Peripheral vascular disease, unspecified ([facility] )            Disease prevention and related risk factors of diabetes were identified and discussed  The patient was educated in proper foot wear for diabetics  Also educated in daily foot assessment and routine diabetic foot care  Discussed the importance of controlling BS through diet and exercise  Educated management of LE edema with compression, elevation, and diet  The patient will follow up in 9 weeks for further diabetic foot exam and care     PROCEDURE:  All mycotic toenails were reduced and debrided in length, width, and girth using a nail nipper and dremel  All hyperkeratotic skin lesion(s) were sharply pared with a scalpel / forcep with no bleeding or evidence of ulceration  Patient tolerated procedure(s) well without complications  HPI:  Nicholas Avendano is a 81 y  o year old female seen for diabetic foot exam   The patient has class findings and DPN  Mild tingling sensation in her feet  BS is under control  The patient complained of thick toenails and callus with pain  The patient denied any acute pedal disorder, redness, acute swelling, or recent injury            PAST MEDICAL HISTORY:  Past Medical History:   Diagnosis Date    AAA (abdominal aortic aneurysm) ([facility] )     Anemia     Arthritis     Coronary artery disease     Diabetes mellitus [facility]     Disease of thyroid gland     GERD (gastroesophageal reflux disease)     Hypertension     Morbid obesity ([facility]     Myocardial infarction ([facility] )     Plantar fascial fibromatosis     PVD (peripheral vascular disease) ([facility] )     Sleep apnea        PAST SURGICAL HISTORY:  Past Surgical History:   Procedure Laterality Date    CARDIAC SURGERY      cardiac stents x 8    COLONOSCOPY      colon polyps    HERNIA REPAIR      umb x 2    CT COLONOSCOPY FLX DX W/COLLJ SPEC WHEN PFRMD N/A 1/2/2019    Procedure: COLONOSCOPY;  Surgeon: Ari Mortensen MD;  Location: [facility];  Service: Gastroenterology    CT ESOPHAGOGASTRODUODENOSCOPY TRANSORAL DIAGNOSTIC N/A 1/2/2019    Procedure: ESOPHAGOGASTRODUODENOSCOPY (EGD);   Surgeon: Ari Mortensen MD;  Location: [facility];  Service: Gastroenterology    TUBAL LIGATION          ALLERGIES:  Penicillins    MEDICATIONS:  Current Outpatient Medications   Medication Sig Dispense Refill    amLODIPine (NORVASC) 5 mg tablet Take 1 tablet by mouth daily        aspirin 81 MG tablet Take 1 tablet by mouth daily      atorvastatin (LIPITOR) 80 mg tablet Take 1 tablet by mouth daily at bedtime        carvedilol (COREG) 25 mg tablet Take 1 tablet by mouth 2 (two) times a day      cetirizine (ZyrTEC) 10 mg tablet Take 10 mg by mouth daily as needed        ferrous sulfate 325 (65 Fe) mg tablet Take 1 tablet by mouth daily with breakfast        fluticasone (FLONASE) 50 mcg/act nasal spray 2 sprays into each nostril daily as needed      furosemide (LASIX) 20 mg tablet Take 40 mg by mouth daily as needed        glipiZIDE (GLUCOTROL) 5 mg tablet Take 1 tablet by mouth 2 (two) times a day      ketotifen (ZADITOR) 0 025 % ophthalmic solution Administer 1 drop to both eyes as needed      Lancets (ONETOUCH ULTRASOFT) lancets by Does not apply route      lansoprazole (PREVACID) 30 mg capsule Take 1 capsule by mouth daily      levothyroxine 50 mcg tablet Take 50 mcg by mouth daily        losartan (COZAAR) 100 MG tablet Take 1 tablet by mouth daily      metFORMIN (GLUCOPHAGE) 1000 MG tablet Take 1 tablet by mouth 2 (two) times a day      mometasone (ASMANEX 30 METERED DOSES) 220 MCG/INH inhaler Inhale as needed        nitroglycerin (NITROSTAT) 0 4 mg SL tablet Place 1 tablet (0 4 mg total) under the tongue every 5 (five) minutes as needed for chest pain 90 tablet 3    polyethylene glycol (GOLYTELY) 4000 mL solution At 6:00 pm the day prior to scheduled capsule procedure, mix the gallon of Golytely as directed  Drink ONLY ONE HALF gallon of Golytely  4000 mL 0    potassium chloride (KLOR-CON M20) 20 mEq tablet Take by mouth 2 (two) times a day as needed        prasugrel (EFFIENT) tablet Take 1 tablet (10 mg total) by mouth daily 90 tablet 4    terazosin (HYTRIN) 5 mg capsule Take 2 capsules by mouth daily at bedtime        traMADol (ULTRAM) 50 mg tablet Take 1 tablet by mouth every 6 (six) hours as needed       No current facility-administered medications for this visit  SOCIAL HISTORY:  Social History     Socioeconomic History    Marital status: /Civil Union     Spouse name: None    Number of children: None    Years of education: None    Highest education level: None   Occupational History    None   Social Needs    Financial resource strain: None    Food insecurity:     Worry: None     Inability: None    Transportation needs:     Medical: None     Non-medical: None   Tobacco Use    Smoking status: Former Smoker     Packs/day: 1 50     Years: 30 00     Pack years: 45 00     Last attempt to quit:      Years since quittin 6    Smokeless tobacco: Never Used   Substance and Sexual Activity    Alcohol use:  Yes     Alcohol/week: 1 0 standard drinks     Types: 1 Glasses of wine per week     Comment: rare-2 x/ year    Drug use: No    Sexual activity: None   Lifestyle    Physical activity:     Days per week: None     Minutes per session: None    Stress: None   Relationships    Social connections:     Talks on phone: None     Gets together: None     Attends Jew service: None     Active member of club or organization: None     Attends meetings of clubs or organizations: None     Relationship status: None    Intimate partner violence:     Fear of current or ex partner: None     Emotionally abused: None     Physically abused: None Forced sexual activity: None   Other Topics Concern    None   Social History Narrative    None        REVIEW OF SYSTEMS:  GENERAL: NAD, afebrile  HEART: No chest pain, or palpitation  RESPIRATORY:  No acute SOB or cough  GI: No Nausea, vomit or diarrhea  NEUROLOGIC: No syncope or acute weakness    PHYSICAL EXAM:  VASCULAR EXAM  Dorsalis pedis  +1, Posterior tibial artery  absent  The patient has class findings with skin atrophy, lack of digital hair, and nail dystrophy  There is +2 lower extremity edema bilaterally  Venous stasis skin changes noted BLE  NEUROLOGIC EXAM  Sensation is intact to light touch  Sensation is intact to 10gm monofilament  No focal neurologic deficit  DERMATOLOGIC EXAM:   No ulcer or cellulitis noted  The patient has hypertrophic toenails X 6 with discoloration, onycholysis, and subungal debris  No notable skin lesion  Patient has hyperkeratosis in right submet 5  MUSCULOSKELETAL EXAM:   No acute joint pain, edema, or redness  No acute musculoskeletal problem  Patient has deformity including hammertoe

## 2019-09-11 ENCOUNTER — CLINICAL SUPPORT (OUTPATIENT)
Dept: GASTROENTEROLOGY | Facility: CLINIC | Age: 69
End: 2019-09-11
Payer: COMMERCIAL

## 2019-09-11 DIAGNOSIS — D50.9 IRON DEFICIENCY ANEMIA, UNSPECIFIED IRON DEFICIENCY ANEMIA TYPE: ICD-10-CM

## 2019-09-11 PROCEDURE — 91110 GI TRC IMG INTRAL ESOPH-ILE: CPT | Performed by: INTERNAL MEDICINE

## 2019-09-11 NOTE — PROGRESS NOTES
Patient here for capsule endoscopy  Tolerated bowel prep  Remained NPO after midnight  Reviewed capsule equipment and instructions  Patient verbalized understanding    Patient to return at 4:30 PM

## 2019-09-11 NOTE — LETTER
September 13, 2019     Andrew Lu, [address]  [place]    Patient: Yesica Garcia   YOB: 1950   Date of Visit: 9/11/2019       Dear Dr Chad Ureña Recipients: Thank you for referring Yesica Garcia to me for evaluation  Below are my notes for this consultation  If you have questions, please do not hesitate to call me  I look forward to following your patient along with you  Sincerely,        Karine Fischer        CC: MD Amandeep Martinez MD Dossie Cradle, DO  9/13/2019  5:09 PM  Signed  Brief capsule endoscopy note;     Normal transit through esophagus, stomach and small bowel  There are a few small petechiae in the stomach, 1 with a small amount of ooze  Two small nonbleeding AVMs, 1 in the proximal jejunum, the other in the mid to distal ileum  I called the patient with results  I recommended a push enteroscopy for treatment of the gastric and jejunal findings  She would rather follow-up with her cardiologist to discuss whether Effient can be stopped  She will call to make an appointment with him  If Effient must continue or if anemia persist in the future I strongly recommended arranging push enteroscopy, especially with her ongoing iron infusion needs  She agreed

## 2019-09-13 NOTE — PROGRESS NOTES
Brief capsule endoscopy note;     Normal transit through esophagus, stomach and small bowel  There are a few small petechiae in the stomach, 1 with a small amount of ooze  Two small nonbleeding AVMs, 1 in the proximal jejunum, the other in the mid to distal ileum  I called the patient with results  I recommended a push enteroscopy for treatment of the gastric and jejunal findings  She would rather follow-up with her cardiologist to discuss whether Effient can be stopped  She will call to make an appointment with him  If Effient must continue or if anemia persist in the future I strongly recommended arranging push enteroscopy, especially with her ongoing iron infusion needs  She agreed

## 2019-09-24 ENCOUNTER — TELEPHONE (OUTPATIENT)
Dept: GASTROENTEROLOGY | Facility: CLINIC | Age: 69
End: 2019-09-24

## 2019-09-24 NOTE — TELEPHONE ENCOUNTER
Spoke with patient  I was able to answer some of her questions  I scheduled patient for appt to discuss results and KK's recommendations for push enteroscopy based on capsule study  She wanted to discuss with Dr Le Cota and first opening was 11/11/19  She states she would like to meet with Walker Ayala with her daughter to discuss  I was not certain this could wait until 11/11 or if patient needs to be seen sooner? Thank you  Patient requested capsule be sent to Dr Cadence Saha and Dr Montana Acevedo

## 2019-09-24 NOTE — TELEPHONE ENCOUNTER
Pt called she would like clarification on capsule results  She says she was told there is a hole in her stomach and she is confused about this      ce

## 2019-09-24 NOTE — TELEPHONE ENCOUNTER
Progress note with capsule results faxed via Epic to Dr Guido Rainey and sent to Dr Merari Valdes in box

## 2019-09-27 NOTE — TELEPHONE ENCOUNTER
Left message on the pateints v- agree with Dr Mauricio Stark advice -w ill discuss a the office visit

## 2019-10-04 ENCOUNTER — OFFICE VISIT (OUTPATIENT)
Dept: GASTROENTEROLOGY | Facility: CLINIC | Age: 69
End: 2019-10-04
Payer: COMMERCIAL

## 2019-10-04 VITALS
HEIGHT: 66 IN | SYSTOLIC BLOOD PRESSURE: 148 MMHG | HEART RATE: 62 BPM | BODY MASS INDEX: 46.45 KG/M2 | DIASTOLIC BLOOD PRESSURE: 78 MMHG | WEIGHT: 289 LBS

## 2019-10-04 DIAGNOSIS — G47.33 OSA (OBSTRUCTIVE SLEEP APNEA): ICD-10-CM

## 2019-10-04 DIAGNOSIS — Z79.02 LONG TERM (CURRENT) USE OF ANTITHROMBOTICS/ANTIPLATELETS: ICD-10-CM

## 2019-10-04 DIAGNOSIS — I25.10 CORONARY ARTERY DISEASE INVOLVING NATIVE CORONARY ARTERY OF NATIVE HEART WITHOUT ANGINA PECTORIS: ICD-10-CM

## 2019-10-04 DIAGNOSIS — D50.0 IRON DEFICIENCY ANEMIA SECONDARY TO BLOOD LOSS (CHRONIC): Primary | ICD-10-CM

## 2019-10-04 DIAGNOSIS — Q27.30 AVM (ARTERIOVENOUS MALFORMATION): ICD-10-CM

## 2019-10-04 PROCEDURE — 99214 OFFICE O/P EST MOD 30 MIN: CPT | Performed by: INTERNAL MEDICINE

## 2019-10-04 NOTE — LETTER
October 4, 2019     [place], [address]  [address] [address]    Patient: Mitzi Edmond   YOB: 1950   Date of Visit: 10/4/2019       Dear Dr José Luis Roberson: Thank you for referring Mitzi Edmond to me for evaluation  Below are my notes for this consultation  If you have questions, please do not hesitate to call me  I look forward to following your patient along with you  Sincerely,        Ade Sanchez MD        CC: MD Mian Goncalves MD Marrie Bolster, MD  10/4/2019  5:44 PM  Incomplete  [address] [facility] - Outpatient Follow-up Note  Mitzi Edmond 81 y o  female MRN: [id number]  Encounter: [id number]    ASSESSMENT AND PLAN:      1  Iron deficiency anemia secondary to blood loss (chronic)  -patient with continued issues with anemia  Last hemoglobin was 10 3 in late July   -continues to receive iron infusions  -recent capsule study showed a source with a possible AVM in the stomach and 2 in the small bowel (1 in the proximal jejunum, 1 in the ileum)    egd-with pediatric colonoscope at [facility] near future    -patient has a follow-up exam with Dr Humaira Joyce next week to check hemoglobin  -follow-up with cardiology as well Dr Hawthorne   -patient with a relative high risk for endoscopic procedures given the fact that she has sleep apnea  And coronary disease  2  AVM (arteriovenous malformation)  Probably source of problem 1      3  Coronary artery disease involving native coronary artery of native heart without angina pectoris  Has been stable    4  Long term (current) use of antithrombotics/antiplatelets    Being treated with aspirin and Effient  The patient has total of 8 stents  One wonders if the patient could come off the Effient  -would like to have the patient off Effient for 5-7 days prior to endoscopy  Would need cardiac clearance    5   WILLY (obstructive sleep apnea)  -as CPAP at home      Followup Appointment: Pending EGD  ______________________________________________________________________    Chief Complaint   Patient presents with    Capsule results     HPI:  Patient returns to the office with her daughter Margaret Herr for follow up with respect longstanding anemia  The patient EGD and colonoscopy in January of this year which did not ascertain the source for GI blood loss  Patient has been receiving iron therapy intermittently over the last several months  Over the summer her hemoglobin was in the 10 g range  She gets about an iron infusion once a month  In the past her hemoglobin did go down to the 6-7 g range requiring transfusion  She denies any issues with overt bleeding  She does not have abdominal pain or heartburn  Patient did have capsule endoscopy done last month  This demonstrated some mild oozing a possible AVM in the stomach as well as the proximal jejunum  There may have been an AVM as well in the distal ileum  Upper endoscopy using a pediatric colonoscope was advised  Patient wants to confer in the office before proceeding and have her daughter come to the visit  It should be noted that the patient is on aspirin and Effient  She has had a total of 8 stents put over the years last being several years ago  She has never had a stroke  She does not have active angina  She follows with Joaquin Man has her cardiologist in [place]    She states she is getting a little tired getting iron infusions    Historical Information   Past Medical History:   Diagnosis Date    AAA (abdominal aortic aneurysm) (HCC)     Anemia     Arthritis     Coronary artery disease     [facility]     Disease of thyroid gland     GERD (gastroesophageal reflux disease)     Hypertension     Morbid obesity ([facility]     Myocardial infarction ([facility]     Plantar fascial fibromatosis     PVD (peripheral vascular disease) ([facility]     Sleep apnea      Past Surgical History:   Procedure Laterality Date    CARDIAC SURGERY      cardiac stents x 8    COLONOSCOPY      colon polyps    HERNIA REPAIR      umb x 2    MO COLONOSCOPY FLX DX W/COLLJ SPEC WHEN PFRMD N/A 2019    Procedure: COLONOSCOPY;  Surgeon: Ade Sanchez MD;  [facility];  Service: Gastroenterology    MO ESOPHAGOGASTRODUODENOSCOPY TRANSORAL DIAGNOSTIC N/A 2019    Procedure: ESOPHAGOGASTRODUODENOSCOPY (EGD);   Surgeon: Ade Sanchez MD;  [facility];  Service: Gastroenterology    TUBAL LIGATION       Social History     Substance and Sexual Activity   Alcohol Use Yes    Alcohol/week: 1 0 standard drinks    Types: 1 Glasses of wine per week    Comment: rare-2 x/ year     Social History     Substance and Sexual Activity   Drug Use No     Social History     Tobacco Use   Smoking Status Former Smoker    Packs/day: 1 50    Years: 30 00    Pack years: 37 1    Last attempt to quit:     Years since quittin 7   Smokeless Tobacco Never Used     Family History   Problem Relation Age of Onset    Cancer Mother     Heart disease Father     Diabetes Father     Heart disease Sister     Heart disease Brother     Hypertension Family     Arthritis Family     Colon polyps Neg Hx     Colon cancer Neg Hx          Current Outpatient Medications:     amLODIPine (NORVASC) 5 mg tablet    aspirin 81 MG tablet    atorvastatin (LIPITOR) 80 mg tablet    carvedilol (COREG) 25 mg tablet    cetirizine (ZyrTEC) 10 mg tablet    ferrous sulfate 325 (65 Fe) mg tablet    fluticasone (FLONASE) 50 mcg/act nasal spray    furosemide (LASIX) 20 mg tablet    glipiZIDE (GLUCOTROL) 5 mg tablet    ketotifen (ZADITOR) 0 025 % ophthalmic solution    Lancets (ONETOUCH ULTRASOFT) lancets    lansoprazole (PREVACID) 30 mg capsule    levothyroxine 50 mcg tablet    losartan (COZAAR) 100 MG tablet    metFORMIN (GLUCOPHAGE) 1000 MG tablet    mometasone (ASMANEX 30 METERED DOSES) 220 MCG/INH inhaler    nitroglycerin (NITROSTAT) 0 4 mg SL tablet   polyethylene glycol (GOLYTELY) 4000 mL solution    potassium chloride (KLOR-CON M20) 20 mEq tablet    prasugrel (EFFIENT) tablet    terazosin (HYTRIN) 5 mg capsule    traMADol (ULTRAM) 50 mg tablet  Allergies   Allergen Reactions    Penicillins Anaphylaxis     Ten point review of systems  General positive for fatigue positive for weight gain  Respiratory positive for shortness of breath at rest positive for shortness of breath with exertion positive for wheezing  CV negative for chest pain  GI please see above  Heme positive for easy bruising  Musculoskeletal positive for painful joints positive for leg cramps positive for joint stiffness positive for muscle aches positive for chronic pain  Skin positive for significant edema  Neurologic positive for involuntary movements positive for tremor positive for tingling and numbness positive for loss of strength  Positive for anxiety positive for eating issues    PHYSICAL EXAM:    Blood pressure 148/78, pulse 62, height 5' 6" (1 676 m), weight 131 kg (289 lb)  Body mass index is 46 65 kg/m²  General Appearance: NAD, cooperative, alert-  Eyes: Anicteric, PERRLA, EOMI  ENT:  Normocephalic, atraumatic, normal mucosa  Neck:  Supple, symmetrical, trachea midline  Resp:  Clear to auscultation bilaterally; no rales, rhonchi or wheezing;-decreased breath sounds bilaterally  CV:  S1 S2, Regular rate and rhythm; no murmur, rub, or gallop  GI:  Soft, non-tender, non-distended; normal bowel sounds; no masses, no organomegaly-obese    Rectal: Deferred  Musculoskeletal: No cyanosis, clubbing - 2+ edema to the knees  Normal ROM    Skin:  No jaundice, rashes, or lesions   Heme/Lymph: No palpable cervical lymphadenopathy  Psych: Normal affect, good eye contact  Neuro: No gross deficits, AAOx3        Silver Pacheco MD  10/4/2019  4:51 PM  Sign at close encounter  [address] [facility] - Outpatient Follow-up Note  Vergie June 81 y o  female MRN: [id number]  Encounter: [id number]    ASSESSMENT AND PLAN:      1  Iron deficiency anemia secondary to blood loss (chronic)  -patient with continued issues with anemia  Last hemoglobin was 10 3 in late July   -continues to receive iron infusions  -recent capsule study showed a source with a possible AVM in the stomach and 2 in the small bowel (1 in the proximal jejunum, 1 in the ileum)    egd-with pediatric colonoscope at [facility] near future  -patient has a follow-up exam with Dr Jared Briggs next week to check hemoglobin  -follow-up with cardiology as well Dr Hawthorne     2  AVM (arteriovenous malformation)  Probably source of problem 1      3  Coronary artery disease involving native coronary artery of native heart without angina pectoris  Has been stable    4  Long term (current) use of antithrombotics/antiplatelets    Being treated with aspirin and Effient  The patient has total of 8 stents  One wonders if the patient could come off the Effient  -would like to have the patient off Effient for 5-7 days prior to endoscopy  Would need cardiac clearance    5   WILLY (obstructive sleep apnea)  -as CPAP at home      Followup Appointment:  Pending EGD  ______________________________________________________________________    Chief Complaint   Patient presents with    Capsule results     HPI:  Patient returns to the office with her daughter Shalini Waite for follow up with respect     Historical Information   Past Medical History:   Diagnosis Date    [facility]     Anemia     Arthritis     Coronary artery disease     Diabetes [facility]     Disease of thyroid gland     GERD (gastroesophageal reflux disease)     Hypertension     Morbid obesity [facility]     Myocardial infarction [facility]     Plantar fascial fibromatosis     PVD (peripheral vascular disease) ([facility]     Sleep apnea      Past Surgical History:   Procedure Laterality Date    CARDIAC SURGERY      cardiac stents x 8    COLONOSCOPY colon polyps    HERNIA REPAIR      umb x 2    NH COLONOSCOPY FLX DX W/COLLJ SPEC WHEN PFRMD N/A 2019    Procedure: COLONOSCOPY;  Surgeon: Rizwana Moeller MD;  [facility];  Service: Gastroenterology    NH ESOPHAGOGASTRODUODENOSCOPY TRANSORAL DIAGNOSTIC N/A 2019    Procedure: ESOPHAGOGASTRODUODENOSCOPY (EGD);   Surgeon: Rizwana Moeller MD;  [facility];  Service: Gastroenterology    TUBAL LIGATION       Social History     Substance and Sexual Activity   Alcohol Use Yes    Alcohol/week: 1 0 standard drinks    Types: 1 Glasses of wine per week    Comment: rare-2 x/ year     Social History     Substance and Sexual Activity   Drug Use No     Social History     Tobacco Use   Smoking Status Former Smoker    Packs/day: 1 50    Years: 30 00    Pack years: 37 1    Last attempt to quit:     Years since quittin 7   Smokeless Tobacco Never Used     Family History   Problem Relation Age of Onset    Cancer Mother     Heart disease Father     Diabetes Father     Heart disease Sister     Heart disease Brother     Hypertension Family     Arthritis Family     Colon polyps Neg Hx     Colon cancer Neg Hx          Current Outpatient Medications:     amLODIPine (NORVASC) 5 mg tablet    aspirin 81 MG tablet    atorvastatin (LIPITOR) 80 mg tablet    carvedilol (COREG) 25 mg tablet    cetirizine (ZyrTEC) 10 mg tablet    ferrous sulfate 325 (65 Fe) mg tablet    fluticasone (FLONASE) 50 mcg/act nasal spray    furosemide (LASIX) 20 mg tablet    glipiZIDE (GLUCOTROL) 5 mg tablet    ketotifen (ZADITOR) 0 025 % ophthalmic solution    Lancets (ONETOUCH ULTRASOFT) lancets    lansoprazole (PREVACID) 30 mg capsule    levothyroxine 50 mcg tablet    losartan (COZAAR) 100 MG tablet    metFORMIN (GLUCOPHAGE) 1000 MG tablet    mometasone (ASMANEX 30 METERED DOSES) 220 MCG/INH inhaler    nitroglycerin (NITROSTAT) 0 4 mg SL tablet    polyethylene glycol (GOLYTELY) 4000 mL solution   potassium chloride (KLOR-CON M20) 20 mEq tablet    prasugrel (EFFIENT) tablet    terazosin (HYTRIN) 5 mg capsule    traMADol (ULTRAM) 50 mg tablet  Allergies   Allergen Reactions    Penicillins Anaphylaxis       PHYSICAL EXAM:    Blood pressure 148/78, pulse 62, height 5' 6" (1 676 m), weight 131 kg (289 lb)  Body mass index is 46 65 kg/m²  General Appearance: NAD, cooperative, alert  Eyes: Anicteric, PERRLA, EOMI  ENT:  Normocephalic, atraumatic, normal mucosa  Neck:  Supple, symmetrical, trachea midline  Resp:  Clear to auscultation bilaterally; no rales, rhonchi or wheezing; respirations unlabored   CV:  S1 S2, Regular rate and rhythm; no murmur, rub, or gallop  GI:  Soft, non-tender, non-distended; normal bowel sounds; no masses, no organomegaly   Rectal: Deferred  Musculoskeletal: No cyanosis, clubbing or edema  Normal ROM  Skin:  No jaundice, rashes, or lesions   Heme/Lymph: No palpable cervical lymphadenopathy  Psych: Normal affect, good eye contact  Neuro: No gross deficits, AAOx3    Lab Results:   Lab Results   Component Value Date    WBC 6 08 05/04/2018    HGB 9 8 (L) 05/04/2018    HCT 31 8 (L) 05/04/2018    MCV 96 05/04/2018     (L) 05/04/2018     Lab Results   Component Value Date     12/14/2015    K 4 4 05/08/2018     05/08/2018    CO2 25 05/08/2018    ANIONGAP 8 12/14/2015    BUN 17 05/08/2018    CREATININE 0 85 05/08/2018    GLUCOSE 83 12/14/2015    GLUF 135 (H) 05/03/2018    CALCIUM 8 6 05/04/2018    EGFR 70 05/04/2018     No results found for: IRON, TIBC, FERRITIN  No results found for: LIPASE    Radiology Results:   No results found

## 2019-10-04 NOTE — PATIENT INSTRUCTIONS
[address] [facility] - Outpatient Follow-up Note  Roxanna Isidro 81 y o  female MRN: [id number]  Encounter: [id number]    ASSESSMENT AND PLAN:      1  Iron deficiency anemia secondary to blood loss (chronic)  -patient with continued issues with anemia  Last hemoglobin was 10 3 in late July   -continues to receive iron infusions  -recent capsule study showed a source with a possible AVM in the stomach and 2 in the small bowel (1 in the proximal jejunum, 1 in the ileum)    egd-with pediatric colonoscope at [facility] near future  -patient has a follow-up exam with Dr Ariadna Hernandez next week to check hemoglobin  -follow-up with cardiology as well Dr Hawthorne     2  AVM (arteriovenous malformation)  Probably source of problem 1      3  Coronary artery disease involving native coronary artery of native heart without angina pectoris  Has been stable    4  Long term (current) use of antithrombotics/antiplatelets    Being treated with aspirin and Effient  The patient has total of 8 stents  One wonders if the patient could come off the Effient  -would like to have the patient off Effient for 5-7 days prior to endoscopy  Would need cardiac clearance    5   WILLY (obstructive sleep apnea)  -as CPAP at home      Followup Appointment:  Pending EGD

## 2019-10-04 NOTE — PROGRESS NOTES
[facility] - Outpatient Follow-up Note  Estela Cruz 81 y o  female MRN: [id number]  Encounter: [id number]    ASSESSMENT AND PLAN:      1  Iron deficiency anemia secondary to blood loss (chronic)  -patient with continued issues with anemia  Last hemoglobin was 10 3 in late July   -continues to receive iron infusions  -recent capsule study showed a source with a possible AVM in the stomach and 2 in the small bowel (1 in the proximal jejunum, 1 in the ileum)    egd-with pediatric colonoscope at [facility] near future    -patient has a follow-up exam with Dr Jyoti Dobson next week to check hemoglobin  -follow-up with cardiology as well Dr Hawthorne   -patient with a relative high risk for endoscopic procedures given the fact that she has sleep apnea  And coronary disease  2  AVM (arteriovenous malformation)  Probably source of problem 1      3  Coronary artery disease involving native coronary artery of native heart without angina pectoris  Has been stable    4  Long term (current) use of antithrombotics/antiplatelets    Being treated with aspirin and Effient  The patient has total of 8 stents  One wonders if the patient could come off the Effient  -would like to have the patient off Effient for 5-7 days prior to endoscopy  Would need cardiac clearance    5  WILLY (obstructive sleep apnea)  -as CPAP at home      Followup Appointment:  Pending EGD  ______________________________________________________________________    Chief Complaint   Patient presents with    Capsule results     HPI:  Patient returns to the office with her daughter Julius Ha for follow up with respect longstanding anemia  The patient EGD and colonoscopy in January of this year which did not ascertain the source for GI blood loss  Patient has been receiving iron therapy intermittently over the last several months  Over the summer her hemoglobin was in the 10 g range  She gets about an iron infusion once a month    In the past her hemoglobin did go down to the 6-7 g range requiring transfusion  She denies any issues with overt bleeding  She does not have abdominal pain or heartburn  Patient did have capsule endoscopy done last month  This demonstrated some mild oozing a possible AVM in the stomach as well as the proximal jejunum  There may have been an AVM as well in the distal ileum  Upper endoscopy using a pediatric colonoscope was advised  Patient wants to confer in the office before proceeding and have her daughter come to the visit  It should be noted that the patient is on aspirin and Effient  She has had a total of 8 stents put over the years last being several years ago  She has never had a stroke  She does not have active angina  She follows with Joaquin Man has her cardiologist in [place]  She states she is getting a little tired getting iron infusions    Historical Information   Past Medical History:   Diagnosis Date    AAA (abdominal aortic aneurysm) (HCC)     Anemia     Arthritis     Coronary artery disease     Diabetes mellitus ([facility] )     Disease of thyroid gland     GERD (gastroesophageal reflux disease)     Hypertension     [facility]     Myocardial infarction [facility] )     Plantar fascial fibromatosis     PVD (peripheral vascular disease) ([facility] )     Sleep apnea      Past Surgical History:   Procedure Laterality Date    CARDIAC SURGERY      cardiac stents x 8    COLONOSCOPY      colon polyps    HERNIA REPAIR      umb x 2    HI COLONOSCOPY FLX DX W/COLLJ SPEC WHEN PFRMD N/A 1/2/2019    Procedure: COLONOSCOPY;  Surgeon: Horace Esteban MD;  Location:  MAIN OR;  Service: Gastroenterology    HI ESOPHAGOGASTRODUODENOSCOPY TRANSORAL DIAGNOSTIC N/A 1/2/2019    Procedure: ESOPHAGOGASTRODUODENOSCOPY (EGD);   Surgeon: Horace Esteban MD;  [facility];  Service: Gastroenterology    TUBAL LIGATION       Social History     Substance and Sexual Activity   Alcohol Use Yes    Alcohol/week: 1 0 standard drinks    Types: 1 Glasses of wine per week    Comment: rare-2 x/ year     Social History     Substance and Sexual Activity   Drug Use No     Social History     Tobacco Use   Smoking Status Former Smoker    Packs/day: 1 50    Years: 30 00    Pack years: 37 1    Last attempt to quit:     Years since quittin 7   Smokeless Tobacco Never Used     Family History   Problem Relation Age of Onset    Cancer Mother     Heart disease Father     Diabetes Father     Heart disease Sister     Heart disease Brother     Hypertension Family     Arthritis Family     Colon polyps Neg Hx     Colon cancer Neg Hx          Current Outpatient Medications:     amLODIPine (NORVASC) 5 mg tablet    aspirin 81 MG tablet    atorvastatin (LIPITOR) 80 mg tablet    carvedilol (COREG) 25 mg tablet    cetirizine (ZyrTEC) 10 mg tablet    ferrous sulfate 325 (65 Fe) mg tablet    fluticasone (FLONASE) 50 mcg/act nasal spray    furosemide (LASIX) 20 mg tablet    glipiZIDE (GLUCOTROL) 5 mg tablet    ketotifen (ZADITOR) 0 025 % ophthalmic solution    Lancets (ONETOUCH ULTRASOFT) lancets    lansoprazole (PREVACID) 30 mg capsule    levothyroxine 50 mcg tablet    losartan (COZAAR) 100 MG tablet    metFORMIN (GLUCOPHAGE) 1000 MG tablet    mometasone (ASMANEX 30 METERED DOSES) 220 MCG/INH inhaler    nitroglycerin (NITROSTAT) 0 4 mg SL tablet    polyethylene glycol (GOLYTELY) 4000 mL solution    potassium chloride (KLOR-CON M20) 20 mEq tablet    prasugrel (EFFIENT) tablet    terazosin (HYTRIN) 5 mg capsule    traMADol (ULTRAM) 50 mg tablet  Allergies   Allergen Reactions    Penicillins Anaphylaxis     Ten point review of systems  General positive for fatigue positive for weight gain  Respiratory positive for shortness of breath at rest positive for shortness of breath with exertion positive for wheezing  CV negative for chest pain  GI please see above  Heme positive for easy bruising  Musculoskeletal positive for painful joints positive for leg cramps positive for joint stiffness positive for muscle aches positive for chronic pain  Skin positive for significant edema  Neurologic positive for involuntary movements positive for tremor positive for tingling and numbness positive for loss of strength  Positive for anxiety positive for eating issues    PHYSICAL EXAM:    Blood pressure 148/78, pulse 62, height 5' 6" (1 676 m), weight 131 kg (289 lb)  Body mass index is 46 65 kg/m²  General Appearance: NAD, cooperative, alert-  Eyes: Anicteric, PERRLA, EOMI  ENT:  Normocephalic, atraumatic, normal mucosa  Neck:  Supple, symmetrical, trachea midline  Resp:  Clear to auscultation bilaterally; no rales, rhonchi or wheezing;-decreased breath sounds bilaterally  CV:  S1 S2, Regular rate and rhythm; no murmur, rub, or gallop  GI:  Soft, non-tender, non-distended; normal bowel sounds; no masses, no organomegaly-obese    Rectal: Deferred  Musculoskeletal: No cyanosis, clubbing - 2+ edema to the knees  Normal ROM    Skin:  No jaundice, rashes, or lesions   Heme/Lymph: No palpable cervical lymphadenopathy  Psych: Normal affect, good eye contact  Neuro: No gross deficits, AAOx3

## 2019-11-11 ENCOUNTER — OFFICE VISIT (OUTPATIENT)
Dept: PODIATRY | Facility: CLINIC | Age: 69
End: 2019-11-11
Payer: COMMERCIAL

## 2019-11-11 VITALS
HEIGHT: 66 IN | DIASTOLIC BLOOD PRESSURE: 75 MMHG | WEIGHT: 289 LBS | SYSTOLIC BLOOD PRESSURE: 145 MMHG | BODY MASS INDEX: 46.45 KG/M2

## 2019-11-11 DIAGNOSIS — E11.40 TYPE 2 DIABETES MELLITUS WITH DIABETIC NEUROPATHY, WITHOUT LONG-TERM CURRENT USE OF INSULIN (HCC): Primary | ICD-10-CM

## 2019-11-11 DIAGNOSIS — L85.1 ACQUIRED KERATODERMA: ICD-10-CM

## 2019-11-11 DIAGNOSIS — I73.9 PERIPHERAL VASCULAR DISEASE, UNSPECIFIED (HCC): ICD-10-CM

## 2019-11-11 DIAGNOSIS — B35.1 ONYCHOMYCOSIS: ICD-10-CM

## 2019-11-11 PROCEDURE — 11055 PARING/CUTG B9 HYPRKER LES 1: CPT | Performed by: PODIATRIST

## 2019-11-11 PROCEDURE — 11721 DEBRIDE NAIL 6 OR MORE: CPT | Performed by: PODIATRIST

## 2019-11-11 NOTE — PROGRESS NOTES
PATIENT:  Willis Toro  1950    ASSESSMENT/PLAN:  1  Type 2 diabetes mellitus with diabetic neuropathy, without long-term current use of insulin ([facility] )     2  Acquired keratoderma     3  Onychomycosis  Debridement   4  Peripheral vascular disease, unspecified ([facility] )  Lesion Destruction          Disease prevention and related risk factors of diabetes were identified and discussed  The patient was educated in proper foot wear for diabetics  Also educated in daily foot assessment and routine diabetic foot care  Discussed the importance of controlling BS through diet and exercise  Educated management of LE edema with compression, elevation, and diet  The patient will follow up in 9 weeks for further diabetic foot exam and care     PROCEDURE:  All mycotic toenails were reduced and debrided in length, width, and girth using a nail nipper and dremel  All hyperkeratotic skin lesion(s) were sharply pared with a scalpel with no bleeding or evidence of ulceration  Patient tolerated procedure(s) well without complications  HPI:  Willis Toro is a 81 y  o year old female seen for diabetic foot exam   The patient has class findings and DPN  Mild tingling sensation in her feet  BS is under control  The patient denied any acute pedal disorder, redness, acute swelling, or recent injury            PAST MEDICAL HISTORY:  Past Medical History:   Diagnosis Date    AAA (abdominal aortic aneurysm) ([facility] )     Anemia     Arthritis     Coronary artery disease     Diabetes mellitus ([facility] )     Disease of thyroid gland     GERD (gastroesophageal reflux disease)     Hypertension     Morbid obesity ([facility] )     Myocardial infarction ([facility] )     Plantar fascial fibromatosis     PVD (peripheral vascular disease) ([facility] )     Sleep apnea        PAST SURGICAL HISTORY:  Past Surgical History:   Procedure Laterality Date    CARDIAC SURGERY      cardiac stents x 8    COLONOSCOPY      colon polyps    HERNIA REPAIR umb x 2    MO COLONOSCOPY FLX DX W/COLLJ SPEC WHEN PFRMD N/A 1/2/2019    Procedure: COLONOSCOPY;  Surgeon: Allyson Olvera MD;  [facility];  Service: Gastroenterology    MO ESOPHAGOGASTRODUODENOSCOPY TRANSORAL DIAGNOSTIC N/A 1/2/2019    Procedure: ESOPHAGOGASTRODUODENOSCOPY (EGD);   Surgeon: Allyson Olvera MD;  [facility];  Service: Gastroenterology    TUBAL LIGATION          ALLERGIES:  Penicillins    MEDICATIONS:  Current Outpatient Medications   Medication Sig Dispense Refill    amLODIPine (NORVASC) 5 mg tablet Take 1 tablet by mouth daily        aspirin 81 MG tablet Take 1 tablet by mouth daily      atorvastatin (LIPITOR) 80 mg tablet Take 1 tablet by mouth daily at bedtime        carvedilol (COREG) 25 mg tablet Take 1 tablet by mouth 2 (two) times a day      cetirizine (ZyrTEC) 10 mg tablet Take 10 mg by mouth daily as needed        ferrous sulfate 325 (65 Fe) mg tablet Take 1 tablet by mouth daily with breakfast        fluticasone (FLONASE) 50 mcg/act nasal spray 2 sprays into each nostril daily as needed      furosemide (LASIX) 20 mg tablet Take 40 mg by mouth daily as needed        glipiZIDE (GLUCOTROL) 5 mg tablet Take 1 tablet by mouth 2 (two) times a day      ketotifen (ZADITOR) 0 025 % ophthalmic solution Administer 1 drop to both eyes as needed      Lancets (ONETOUCH ULTRASOFT) lancets by Does not apply route      lansoprazole (PREVACID) 30 mg capsule Take 1 capsule by mouth daily      levothyroxine 50 mcg tablet Take 50 mcg by mouth daily        losartan (COZAAR) 100 MG tablet Take 1 tablet by mouth daily      metFORMIN (GLUCOPHAGE) 1000 MG tablet Take 1 tablet by mouth 2 (two) times a day      mometasone (ASMANEX 30 METERED DOSES) 220 MCG/INH inhaler Inhale as needed        nitroglycerin (NITROSTAT) 0 4 mg SL tablet Place 1 tablet (0 4 mg total) under the tongue every 5 (five) minutes as needed for chest pain 90 tablet 3    polyethylene glycol (GOLYTELY) 4000 mL solution At 6:00 pm the day prior to scheduled capsule procedure, mix the gallon of Golytely as directed  Drink ONLY ONE HALF gallon of Golytely  4000 mL 0    potassium chloride (KLOR-CON M20) 20 mEq tablet Take by mouth 2 (two) times a day as needed        prasugrel (EFFIENT) tablet Take 1 tablet (10 mg total) by mouth daily 90 tablet 4    terazosin (HYTRIN) 5 mg capsule Take 2 capsules by mouth daily at bedtime        traMADol (ULTRAM) 50 mg tablet Take 1 tablet by mouth every 6 (six) hours as needed       No current facility-administered medications for this visit  SOCIAL HISTORY:  Social History     Socioeconomic History    Marital status: /Civil Union     Spouse name: None    Number of children: None    Years of education: None    Highest education level: None   Occupational History    None   Social Needs    Financial resource strain: None    Food insecurity:     Worry: None     Inability: None    Transportation needs:     Medical: None     Non-medical: None   Tobacco Use    Smoking status: Former Smoker     Packs/day: 1 50     Years: 30 00     Pack years: 45 00     Last attempt to quit:      Years since quittin 8    Smokeless tobacco: Never Used   Substance and Sexual Activity    Alcohol use:  Yes     Alcohol/week: 1 0 standard drinks     Types: 1 Glasses of wine per week     Comment: rare-2 x/ year    Drug use: No    Sexual activity: None   Lifestyle    Physical activity:     Days per week: None     Minutes per session: None    Stress: None   Relationships    Social connections:     Talks on phone: None     Gets together: None     Attends Sabianist service: None     Active member of club or organization: None     Attends meetings of clubs or organizations: None     Relationship status: None    Intimate partner violence:     Fear of current or ex partner: None     Emotionally abused: None     Physically abused: None     Forced sexual activity: None   Other Topics Concern    None   Social History Narrative    None        REVIEW OF SYSTEMS:  GENERAL: NAD, afebrile  HEART: No chest pain, or palpitation  RESPIRATORY:  No acute SOB or cough  GI: No Nausea, vomit or diarrhea  NEUROLOGIC: No syncope or acute weakness    PHYSICAL EXAM:  VASCULAR EXAM  Dorsalis pedis  +1, Posterior tibial artery  absent  The patient has class findings with skin atrophy, lack of digital hair, and nail dystrophy  There is +2 lower extremity edema bilaterally  Venous stasis skin changes noted BLE  NEUROLOGIC EXAM  Sensation is intact to light touch  Sensation is intact to 10gm monofilament  No focal neurologic deficit  DERMATOLOGIC EXAM:   No ulcer or cellulitis noted  The patient has hypertrophic toenails X 6 with discoloration, onycholysis, and subungal debris  No notable skin lesion  Patient has hyperkeratosis in right submet 5  MUSCULOSKELETAL EXAM:   No acute joint pain, edema, or redness  No acute musculoskeletal problem  Patient has deformity including hammertoe

## 2020-01-13 ENCOUNTER — OFFICE VISIT (OUTPATIENT)
Dept: PODIATRY | Facility: CLINIC | Age: 70
End: 2020-01-13
Payer: COMMERCIAL

## 2020-01-13 VITALS
DIASTOLIC BLOOD PRESSURE: 74 MMHG | WEIGHT: 289 LBS | BODY MASS INDEX: 46.45 KG/M2 | HEIGHT: 66 IN | SYSTOLIC BLOOD PRESSURE: 140 MMHG

## 2020-01-13 DIAGNOSIS — L85.1 ACQUIRED KERATODERMA: ICD-10-CM

## 2020-01-13 DIAGNOSIS — E11.40 TYPE 2 DIABETES MELLITUS WITH DIABETIC NEUROPATHY, WITHOUT LONG-TERM CURRENT USE OF INSULIN (HCC): Primary | ICD-10-CM

## 2020-01-13 DIAGNOSIS — B35.1 ONYCHOMYCOSIS: ICD-10-CM

## 2020-01-13 DIAGNOSIS — I73.9 PERIPHERAL VASCULAR DISEASE, UNSPECIFIED (HCC): ICD-10-CM

## 2020-01-13 PROCEDURE — 11055 PARING/CUTG B9 HYPRKER LES 1: CPT | Performed by: PODIATRIST

## 2020-01-13 PROCEDURE — 11721 DEBRIDE NAIL 6 OR MORE: CPT | Performed by: PODIATRIST

## 2020-01-13 NOTE — PROGRESS NOTES
PATIENT:  Rosalie Dejesus  1950    ASSESSMENT/PLAN:  1  Type 2 diabetes mellitus with diabetic neuropathy, without long-term current use of insulin ([facility] )     2  Acquired keratoderma  Lesion Destruction   3  Onychomycosis  Debridement   4  Peripheral vascular disease, unspecified ([facility] )            Disease prevention and related risk factors of diabetes were identified and discussed  The patient was educated in proper foot wear for diabetics  Also educated in daily foot assessment and routine diabetic foot care  Discussed the importance of controlling BS through diet and exercise  Educated management of LE edema with compression, elevation, and diet  The patient will follow up in 9 weeks for further diabetic foot exam and care     PROCEDURE:  All mycotic toenails were reduced and debrided in length, width, and girth using a nail nipper and dremel  All hyperkeratotic skin lesion(s) were sharply pared with a scalpel with no bleeding or evidence of ulceration  Patient tolerated procedure(s) well without complications  HPI:  Rosalie Dejesus is a 81 y  o year old female seen for diabetic foot exam   The patient has class findings and DPN  Mild tingling sensation in her feet  BS is under control  The patient denied any acute pedal disorder, redness, acute swelling, or recent injury            PAST MEDICAL HISTORY:  Past Medical History:   Diagnosis Date    AAA (abdominal aortic aneurysm) ([facility] )     Anemia     Arthritis     Coronary artery disease     Diabetes mellitus ([facility]     Disease of thyroid gland     GERD (gastroesophageal reflux disease)     Hypertension     Morbid obesity ([facility] )     Myocardial infarction ([facility] )     Plantar fascial fibromatosis     PVD (peripheral vascular disease) ([facility] )     Sleep apnea        PAST SURGICAL HISTORY:  Past Surgical History:   Procedure Laterality Date    CARDIAC SURGERY      cardiac stents x 8    COLONOSCOPY      colon polyps    HERNIA REPAIR umb x 2    VT COLONOSCOPY FLX DX W/COLLJ SPEC WHEN PFRMD N/A 1/2/2019    Procedure: COLONOSCOPY;  Surgeon: Ulices Guzman MD;  [facility];  Service: Gastroenterology    VT ESOPHAGOGASTRODUODENOSCOPY TRANSORAL DIAGNOSTIC N/A 1/2/2019    Procedure: ESOPHAGOGASTRODUODENOSCOPY (EGD);   Surgeon: Ulices Guzman MD;  [facility];  Service: Gastroenterology    TUBAL LIGATION          ALLERGIES:  Penicillins    MEDICATIONS:  Current Outpatient Medications   Medication Sig Dispense Refill    amLODIPine (NORVASC) 5 mg tablet Take 1 tablet by mouth daily        aspirin 81 MG tablet Take 1 tablet by mouth daily      atorvastatin (LIPITOR) 80 mg tablet Take 1 tablet by mouth daily at bedtime        carvedilol (COREG) 25 mg tablet Take 1 tablet by mouth 2 (two) times a day      cetirizine (ZyrTEC) 10 mg tablet Take 10 mg by mouth daily as needed        ferrous sulfate 325 (65 Fe) mg tablet Take 1 tablet by mouth daily with breakfast        fluticasone (FLONASE) 50 mcg/act nasal spray 2 sprays into each nostril daily as needed      furosemide (LASIX) 20 mg tablet Take 40 mg by mouth daily as needed        glipiZIDE (GLUCOTROL) 5 mg tablet Take 1 tablet by mouth 2 (two) times a day      ketotifen (ZADITOR) 0 025 % ophthalmic solution Administer 1 drop to both eyes as needed      Lancets (ONETOUCH ULTRASOFT) lancets by Does not apply route      lansoprazole (PREVACID) 30 mg capsule Take 1 capsule by mouth daily      levothyroxine 50 mcg tablet Take 50 mcg by mouth daily        losartan (COZAAR) 100 MG tablet Take 1 tablet by mouth daily      metFORMIN (GLUCOPHAGE) 1000 MG tablet Take 1 tablet by mouth 2 (two) times a day      mometasone (ASMANEX 30 METERED DOSES) 220 MCG/INH inhaler Inhale as needed        nitroglycerin (NITROSTAT) 0 4 mg SL tablet Place 1 tablet (0 4 mg total) under the tongue every 5 (five) minutes as needed for chest pain 90 tablet 3    polyethylene glycol (GOLYTELY) 4000 mL solution At 6:00 pm the day prior to scheduled capsule procedure, mix the gallon of Golytely as directed  Drink ONLY ONE HALF gallon of Golytely  4000 mL 0    potassium chloride (KLOR-CON M20) 20 mEq tablet Take by mouth 2 (two) times a day as needed        prasugrel (EFFIENT) tablet Take 1 tablet (10 mg total) by mouth daily 90 tablet 4    terazosin (HYTRIN) 5 mg capsule Take 2 capsules by mouth daily at bedtime        traMADol (ULTRAM) 50 mg tablet Take 1 tablet by mouth every 6 (six) hours as needed       No current facility-administered medications for this visit  SOCIAL HISTORY:  Social History     Socioeconomic History    Marital status: /Civil Union     Spouse name: None    Number of children: None    Years of education: None    Highest education level: None   Occupational History    None   Social Needs    Financial resource strain: None    Food insecurity:     Worry: None     Inability: None    Transportation needs:     Medical: None     Non-medical: None   Tobacco Use    Smoking status: Former Smoker     Packs/day: 1 50     Years: 30 00     Pack years: 45 00     Last attempt to quit:      Years since quittin 0    Smokeless tobacco: Never Used   Substance and Sexual Activity    Alcohol use:  Yes     Alcohol/week: 1 0 standard drinks     Types: 1 Glasses of wine per week     Comment: rare-2 x/ year    Drug use: No    Sexual activity: None   Lifestyle    Physical activity:     Days per week: None     Minutes per session: None    Stress: None   Relationships    Social connections:     Talks on phone: None     Gets together: None     Attends Christian service: None     Active member of club or organization: None     Attends meetings of clubs or organizations: None     Relationship status: None    Intimate partner violence:     Fear of current or ex partner: None     Emotionally abused: None     Physically abused: None     Forced sexual activity: None   Other Topics Concern    None   Social History Narrative    None        REVIEW OF SYSTEMS:  GENERAL: NAD, afebrile  HEART: No chest pain, or palpitation  RESPIRATORY:  No acute SOB or cough  GI: No Nausea, vomit or diarrhea  NEUROLOGIC: No syncope or acute weakness    PHYSICAL EXAM:  VASCULAR EXAM  Dorsalis pedis  +1, Posterior tibial artery  absent  The patient has class findings with skin atrophy, lack of digital hair, and nail dystrophy  There is +2 lower extremity edema bilaterally  Venous stasis skin changes noted BLE  NEUROLOGIC EXAM  Sensation is intact to light touch  Sensation is intact to 10gm monofilament  No focal neurologic deficit  DERMATOLOGIC EXAM:   No ulcer or cellulitis noted  The patient has hypertrophic toenails X 6 with discoloration, onycholysis, and subungal debris  No notable skin lesion  Patient has hyperkeratosis in right submet 5  MUSCULOSKELETAL EXAM:   No acute joint pain, edema, or redness  No acute musculoskeletal problem  Patient has deformity including hammertoe

## 2020-01-22 ENCOUNTER — TRANSCRIBE ORDERS (OUTPATIENT)
Dept: ADMINISTRATIVE | Facility: HOSPITAL | Age: 70
End: 2020-01-22

## 2020-01-22 ENCOUNTER — OFFICE VISIT (OUTPATIENT)
Dept: CARDIOLOGY CLINIC | Facility: CLINIC | Age: 70
End: 2020-01-22
Payer: COMMERCIAL

## 2020-01-22 VITALS
WEIGHT: 290 LBS | BODY MASS INDEX: 46.61 KG/M2 | DIASTOLIC BLOOD PRESSURE: 90 MMHG | SYSTOLIC BLOOD PRESSURE: 160 MMHG | HEART RATE: 68 BPM | HEIGHT: 66 IN

## 2020-01-22 DIAGNOSIS — I71.4 ABDOMINAL AORTIC ANEURYSM (AAA) WITHOUT RUPTURE (HCC): ICD-10-CM

## 2020-01-22 DIAGNOSIS — E78.00 HYPERCHOLESTEROLEMIA: ICD-10-CM

## 2020-01-22 DIAGNOSIS — I10 HYPERTENSION, ESSENTIAL, BENIGN: ICD-10-CM

## 2020-01-22 DIAGNOSIS — I65.23 CAROTID ARTERY STENOSIS, ASYMPTOMATIC, BILATERAL: ICD-10-CM

## 2020-01-22 DIAGNOSIS — I25.10 CORONARY ARTERY DISEASE INVOLVING NATIVE CORONARY ARTERY OF NATIVE HEART WITHOUT ANGINA PECTORIS: Primary | ICD-10-CM

## 2020-01-22 PROCEDURE — 99214 OFFICE O/P EST MOD 30 MIN: CPT | Performed by: INTERNAL MEDICINE

## 2020-01-22 PROCEDURE — 93000 ELECTROCARDIOGRAM COMPLETE: CPT | Performed by: INTERNAL MEDICINE

## 2020-01-22 NOTE — PROGRESS NOTES
Cardiology Follow Up    [facility]  1950  [id number]  [address] [facility] Felecia Souza  [address] [place] [id number] [phone]    1  Coronary artery disease involving native coronary artery of native heart without angina pectoris  POCT ECG   2  Abdominal aortic aneurysm (AAA) without rupture (HCC)     3  Carotid artery stenosis, asymptomatic, bilateral     4  Hypercholesterolemia     5  Hypertension, essential, benign         Interval History:  Cardiology follow-up  Patient doing well from a cardiac point of view  She denies any chest pain or dyspnea  No orthopnea PND  He states been compliant medications, she states been compliant low-cholesterol diet  I do not have a recent fractionation  LDL over a year ago was 62 with an HDL 48, on high-intensity statin therapy  Compliant with low-sodium diet, blood pressure today's elevated 160/90, she tells me that he has been adequately controlled , asked her to do ambulatory blood pressures and call me  With the results  She denies any focal neurological deficits, denies any amaurosis fugax  Patient states been ambulatory although she does not did regular exercise mostly because of DJD  She continues to struggle with her weight as well  She does have anemia, she has received a transfusion and iron infusions  No clear-cut etiology, no obvious overt bleeding  She is on dual antiplatelet therapy      Patient Active Problem List   Diagnosis    CHF (congestive heart failure), NYHA class I, chronic, diastolic (HCC)    Coronary artery disease involving native coronary artery of native heart without angina pectoris    Hypercholesterolemia    Abdominal aortic aneurysm (AAA) without rupture ([address] )    Carotid artery stenosis, asymptomatic, bilateral    Hypertension, essential, benign     Past Medical History:   Diagnosis Date    AAA (abdominal aortic aneurysm) ([address] )  Anemia     Arthritis     Coronary artery disease     Diabetes mellitus ([place] [address] )     Disease of thyroid gland     GERD (gastroesophageal reflux disease)     Hypertension     Morbid obesity ([place] [address] )     Myocardial infarction ([place] [address] )     Plantar fascial fibromatosis     PVD (peripheral vascular disease) ([place] [address] )     Sleep apnea      Social History     Socioeconomic History    Marital status: /Civil Union     Spouse name: Not on file    Number of children: Not on file    Years of education: Not on file    Highest education level: Not on file   Occupational History    Not on file   Social Needs    Financial resource strain: Not on file    Food insecurity:     Worry: Not on file     Inability: Not on file    Transportation needs:     Medical: Not on file     Non-medical: Not on file   Tobacco Use    Smoking status: Former Smoker     Packs/day: 1 50     Years: 30 00     Pack years: 45 00     Last attempt to quit:      Years since quittin 0    Smokeless tobacco: Never Used   Substance and Sexual Activity    Alcohol use:  Yes     Alcohol/week: 1 0 standard drinks     Types: 1 Glasses of wine per week     Comment: rare-2 x/ year    Drug use: No    Sexual activity: Not on file   Lifestyle    Physical activity:     Days per week: Not on file     Minutes per session: Not on file    Stress: Not on file   Relationships    Social connections:     Talks on phone: Not on file     Gets together: Not on file     Attends Taoist service: Not on file     Active member of club or organization: Not on file     Attends meetings of clubs or organizations: Not on file     Relationship status: Not on file    Intimate partner violence:     Fear of current or ex partner: Not on file     Emotionally abused: Not on file     Physically abused: Not on file     Forced sexual activity: Not on file   Other Topics Concern    Not on file   Social History Narrative    Not on file      Family History   Problem Relation Age of Onset    Cancer Mother     Heart disease Father     Diabetes Father     Heart disease Sister     Heart disease Brother     Hypertension Family     Arthritis Family     Colon polyps Neg Hx     Colon cancer Neg Hx      Past Surgical History:   Procedure Laterality Date    CARDIAC SURGERY      cardiac stents x 8    COLONOSCOPY      colon polyps    HERNIA REPAIR      umb x 2    UT COLONOSCOPY FLX DX W/COLLJ SPEC WHEN PFRMD N/A 1/2/2019    Procedure: COLONOSCOPY;  Surgeon: Ulices Guzman MD;  [facility];  Service: Gastroenterology    UT ESOPHAGOGASTRODUODENOSCOPY TRANSORAL DIAGNOSTIC N/A 1/2/2019    Procedure: ESOPHAGOGASTRODUODENOSCOPY (EGD);   Surgeon: Ulices Guzman MD;  [facility];  Service: Gastroenterology    TUBAL LIGATION         Current Outpatient Medications:     amLODIPine (NORVASC) 5 mg tablet, Take 1 tablet by mouth daily  , Disp: , Rfl:     aspirin 81 MG tablet, Take 1 tablet by mouth daily, Disp: , Rfl:     atorvastatin (LIPITOR) 80 mg tablet, Take 1 tablet by mouth daily at bedtime  , Disp: , Rfl:     carvedilol (COREG) 25 mg tablet, Take 1 tablet by mouth 2 (two) times a day, Disp: , Rfl:     cetirizine (ZyrTEC) 10 mg tablet, Take 10 mg by mouth daily as needed  , Disp: , Rfl:     fluticasone (FLONASE) 50 mcg/act nasal spray, 2 sprays into each nostril daily as needed, Disp: , Rfl:     furosemide (LASIX) 20 mg tablet, Take 40 mg by mouth daily as needed  , Disp: , Rfl:     glipiZIDE (GLUCOTROL) 5 mg tablet, Take 1 tablet by mouth 2 (two) times a day, Disp: , Rfl:     ketotifen (ZADITOR) 0 025 % ophthalmic solution, Administer 1 drop to both eyes as needed, Disp: , Rfl:     Lancets (ONETOUCH ULTRASOFT) lancets, by Does not apply route, Disp: , Rfl:     lansoprazole (PREVACID) 30 mg capsule, Take 1 capsule by mouth daily, Disp: , Rfl:     levothyroxine 50 mcg tablet, Take 50 mcg by mouth daily  , Disp: , Rfl:     losartan (COZAAR) 100 MG tablet, Take 1 tablet by mouth daily, Disp: , Rfl:     metFORMIN (GLUCOPHAGE) 1000 MG tablet, Take 1 tablet by mouth 2 (two) times a day, Disp: , Rfl:     mometasone (ASMANEX 30 METERED DOSES) 220 MCG/INH inhaler, Inhale as needed  , Disp: , Rfl:     polyethylene glycol (GOLYTELY) 4000 mL solution, At 6:00 pm the day prior to scheduled capsule procedure, mix the gallon of Golytely as directed  Drink ONLY ONE HALF gallon of Golytely  , Disp: 4000 mL, Rfl: 0    potassium chloride (KLOR-CON M20) 20 mEq tablet, Take by mouth 2 (two) times a day as needed  , Disp: , Rfl:     prasugrel (EFFIENT) tablet, Take 1 tablet (10 mg total) by mouth daily, Disp: 90 tablet, Rfl: 4    terazosin (HYTRIN) 5 mg capsule, Take 2 capsules by mouth daily at bedtime  , Disp: , Rfl:     traMADol (ULTRAM) 50 mg tablet, Take 1 tablet by mouth every 6 (six) hours as needed, Disp: , Rfl:     ferrous sulfate 325 (65 Fe) mg tablet, Take 1 tablet by mouth daily with breakfast  , Disp: , Rfl:     nitroglycerin (NITROSTAT) 0 4 mg SL tablet, Place 1 tablet (0 4 mg total) under the tongue every 5 (five) minutes as needed for chest pain (Patient not taking: Reported on 1/22/2020), Disp: 90 tablet, Rfl: 3  Allergies   Allergen Reactions    Penicillins Anaphylaxis       Labs:  No visits with results within 6 Month(s) from this visit     Latest known visit with results is:   Admission on 01/02/2019, Discharged on 01/02/2019   Component Date Value    POC Glucose 01/02/2019 126     Case Report 01/02/2019                      Value:Surgical Pathology Report                         Case: [id number]                                   Authorizing Provider:  Horace Esteban MD           Collected:           01/02/2019 1014              Ordering Location:     [facility]        Received:            01/02/2019 [address] [place] [address] Operating Room                                                    Pathologist:           Corina Eduardo MD                                                                 Specimens:   A) - Duodenum, rule out celiac                                                                      B) - Stomach, polyps                                                                       Final Diagnosis 01/02/2019                      Value: This result contains rich text formatting which cannot be displayed here   Additional Information 01/02/2019                      Value: This result contains rich text formatting which cannot be displayed here  Nancy Romero Gross Description 01/02/2019                      Value: This result contains rich text formatting which cannot be displayed here  Imaging: No results found  Review of Systems:  Review of Systems   Constitutional: Positive for fatigue  Negative for activity change and unexpected weight change  HENT: Negative for nosebleeds  Eyes: Negative for visual disturbance  Respiratory: Negative for apnea, shortness of breath, wheezing and stridor  Cardiovascular: Positive for leg swelling  Negative for chest pain and palpitations  Gastrointestinal: Negative for abdominal pain, anal bleeding, blood in stool and constipation  Endocrine: Negative for cold intolerance and heat intolerance  Genitourinary: Negative for difficulty urinating and hematuria  Skin: Negative for pallor and rash  Allergic/Immunologic: Positive for immunocompromised state  Neurological: Negative for dizziness, syncope and weakness  Hematological: Bruises/bleeds easily  Psychiatric/Behavioral: Negative for sleep disturbance  The patient is not nervous/anxious  Physical Exam:  Physical Exam   Constitutional:  Non-toxic appearance  She does not appear ill  No distress (  Morbidly obese)  Neck: No JVD present  Cardiovascular: Normal rate and regular rhythm  No extrasystoles are present  Exam reveals no gallop and no friction rub  No murmur heard    Pulses:       Carotid pulses are on the right side with bruit, and on the left side with bruit  Distant heart sound, bilateral carotid bruits, loudest in the right side   Pulmonary/Chest: Effort normal and breath sounds normal  No accessory muscle usage or stridor  No apnea, no tachypnea and no bradypnea  No respiratory distress  She has no decreased breath sounds  She has no wheezes  She has no rhonchi  She has no rales  Musculoskeletal:        Right lower leg: She exhibits edema  Left lower leg: She exhibits edema  Neurological: She is alert  Skin: Skin is warm and dry  Capillary refill takes less than 2 seconds  Psychiatric: She has a normal mood and affect  Discussion/Summary:  Coronary artery disease, complex PTCA/ drug stent of the RCA for recurrent in stent restenosis no she does have 2 layers of stents on 05/18  Suboptimal result with residual 40% stenosis but good flow  We decided in favor of continued dual antiplatelet therapy  No recent lipid profile will check lipid profile, left ventricular systolic function was normal with left hypertrophy and stage I diastolic dysfunction, there was mild-to-moderate tricuspid insufficiency with top normal pulmonary pressures suggested by Doppler criteria  Previous pulmonary function test revealed restrictive disease possibly secondary to size  Will consider new discontinuation of dual antiplatelet therapy  Bleeding issues/ anemia does not resolved  Venous insufficiency, unable to do compression stockings, postural maneuvers were again reinforced  Continue current  Dose of calcium channel blocker, possibly unable to increase the dose given that  She will call me back with ambulatory blood pressures and will make adjustments  Vascular disease  Infrarenal abdominal aortic aneurysm 3 cm on duplex 2018, will repeat the test, she does have also carotid disease, 50-69% on the right and less than 50% on the left, repeat a duplex as well      This note was completed in part utilizing m-modal fluency direct voice recognition software  Grammatical errors, random word insertion, spelling mistakes, and incomplete sentences may be an occasional consequence of the system secondary to software limitations, ambient noise and hardware issues  At the time of dictation, efforts were made to edit, clarify and /or correct errors  Please read the chart carefully and recognize, using context, where substitutions have occurred  If you have any questions or concerns about the context, text or information contained within the body of this dictation, please contact myself, the provider, for further clarification

## 2020-01-31 LAB
CHOLEST SERPL-MCNC: 127 MG/DL (ref 100–199)
HDLC SERPL-MCNC: 51 MG/DL
LDLC SERPL CALC-MCNC: 59 MG/DL (ref 0–99)
LDLC/HDLC SERPL: 1.2 RATIO (ref 0–3.2)
SL AMB VLDL CHOLESTEROL CALC: 17 MG/DL (ref 5–40)
TRIGL SERPL-MCNC: 84 MG/DL (ref 0–149)

## 2020-02-04 ENCOUNTER — TELEPHONE (OUTPATIENT)
Dept: GASTROENTEROLOGY | Facility: CLINIC | Age: 70
End: 2020-02-04

## 2020-02-04 NOTE — TELEPHONE ENCOUNTER
Called pt to schedule f/u appt in April  She states she was told that lesions were observed during her EGD procedure  She asks if it was truly lesions or her aortic aneurysm  Pt is still having low iron and questions what her next step is to fix the issue  She states infusions are getting costly  Please advise      ce

## 2020-02-05 NOTE — TELEPHONE ENCOUNTER
I called patient and reviewed last office visit note noting AVM on capsule study  It was discussed to perform EGD and patient wanted to hold off to discuss at another office visit and have her daughter available  I did offer a sooner appointment with nurse practitioner and she declined and is okay to wait for April with Dr Corina Kilpatrick

## 2020-06-15 ENCOUNTER — OFFICE VISIT (OUTPATIENT)
Dept: PODIATRY | Facility: CLINIC | Age: 70
End: 2020-06-15
Payer: COMMERCIAL

## 2020-06-15 VITALS — WEIGHT: 290 LBS | BODY MASS INDEX: 46.61 KG/M2 | HEIGHT: 66 IN

## 2020-06-15 DIAGNOSIS — L85.1 ACQUIRED KERATODERMA: ICD-10-CM

## 2020-06-15 DIAGNOSIS — E11.40 TYPE 2 DIABETES MELLITUS WITH DIABETIC NEUROPATHY, WITHOUT LONG-TERM CURRENT USE OF INSULIN (HCC): Primary | ICD-10-CM

## 2020-06-15 DIAGNOSIS — B35.1 ONYCHOMYCOSIS: ICD-10-CM

## 2020-06-15 DIAGNOSIS — I73.9 PERIPHERAL VASCULAR DISEASE, UNSPECIFIED (HCC): ICD-10-CM

## 2020-06-15 PROCEDURE — 11055 PARING/CUTG B9 HYPRKER LES 1: CPT | Performed by: PODIATRIST

## 2020-06-15 PROCEDURE — 11721 DEBRIDE NAIL 6 OR MORE: CPT | Performed by: PODIATRIST

## 2020-08-07 ENCOUNTER — HOSPITAL ENCOUNTER (OUTPATIENT)
Dept: NON INVASIVE DIAGNOSTICS | Facility: HOSPITAL | Age: 70
Discharge: HOME | End: 2020-08-07
Attending: INTERNAL MEDICINE
Payer: COMMERCIAL

## 2020-08-07 DIAGNOSIS — I71.4 ABDOMINAL AORTIC ANEURYSM (AAA) WITHOUT RUPTURE (HCC): ICD-10-CM

## 2020-08-07 DIAGNOSIS — I10 HYPERTENSION, ESSENTIAL, BENIGN: ICD-10-CM

## 2020-08-07 DIAGNOSIS — I25.10 CORONARY ARTERY DISEASE INVOLVING NATIVE CORONARY ARTERY OF NATIVE HEART WITHOUT ANGINA PECTORIS: ICD-10-CM

## 2020-08-07 DIAGNOSIS — E78.00 HYPERCHOLESTEROLEMIA: ICD-10-CM

## 2020-08-07 DIAGNOSIS — I65.23 CAROTID ARTERY STENOSIS, ASYMPTOMATIC, BILATERAL: ICD-10-CM

## 2020-08-07 PROCEDURE — 93978 VASCULAR STUDY: CPT

## 2020-08-07 PROCEDURE — 93880 EXTRACRANIAL BILAT STUDY: CPT

## 2020-08-07 PROCEDURE — 93880 EXTRACRANIAL BILAT STUDY: CPT | Performed by: SURGERY

## 2020-08-07 PROCEDURE — 93978 VASCULAR STUDY: CPT | Performed by: SURGERY

## 2020-08-17 ENCOUNTER — OFFICE VISIT (OUTPATIENT)
Dept: PODIATRY | Facility: CLINIC | Age: 70
End: 2020-08-17
Payer: COMMERCIAL

## 2020-08-17 VITALS — BODY MASS INDEX: 46.61 KG/M2 | TEMPERATURE: 97.5 F | HEIGHT: 66 IN | WEIGHT: 290 LBS

## 2020-08-17 DIAGNOSIS — L85.1 ACQUIRED KERATODERMA: ICD-10-CM

## 2020-08-17 DIAGNOSIS — B35.1 ONYCHOMYCOSIS: ICD-10-CM

## 2020-08-17 DIAGNOSIS — I73.9 PERIPHERAL VASCULAR DISEASE, UNSPECIFIED (HCC): ICD-10-CM

## 2020-08-17 DIAGNOSIS — E11.40 TYPE 2 DIABETES MELLITUS WITH DIABETIC NEUROPATHY, WITHOUT LONG-TERM CURRENT USE OF INSULIN (HCC): Primary | ICD-10-CM

## 2020-08-17 PROCEDURE — 11721 DEBRIDE NAIL 6 OR MORE: CPT | Performed by: PODIATRIST

## 2020-08-17 PROCEDURE — 11055 PARING/CUTG B9 HYPRKER LES 1: CPT | Performed by: PODIATRIST

## 2020-08-17 NOTE — PROGRESS NOTES
PATIENT:  Natalia Keller  1950    ASSESSMENT/PLAN:  1  Type 2 diabetes mellitus with diabetic neuropathy, without long-term current use of insulin ([facility] )     2  Acquired keratoderma  Lesion Destruction   3  Onychomycosis  Debridement   4  Peripheral vascular disease, unspecified ([facility] )            Disease prevention and related risk factors of diabetes were identified and discussed  The patient was educated in proper foot wear for diabetics  Also educated in daily foot assessment and routine diabetic foot care  Discussed the importance of controlling BS through diet and exercise  Educated management of LE edema with compression, elevation, and diet  The patient will follow up in 9 weeks for further diabetic foot exam and care     PROCEDURE:  All mycotic toenails were reduced and debrided in length, width, and girth using a nail nipper and dremel  All hyperkeratotic skin lesion(s) were sharply pared with a scalpel with no bleeding or evidence of ulceration  Patient tolerated procedure(s) well without complications  HPI:  Natalia Keller is a 78 y  o year old female seen for diabetic foot exam   The patient has class findings and DPN  Mild tingling sensation in her feet  No foot ulcer or infection  BS has been up and down a little  The patient denied any acute pedal disorder, redness, acute swelling, or recent injury          PAST MEDICAL HISTORY:  Past Medical History:   Diagnosis Date    AAA (abdominal aortic aneurysm) ([facility] )     Anemia     Arthritis     Coronary artery disease     Diabetes mellitus ([facility] )     Disease of thyroid gland     GERD (gastroesophageal reflux disease)     Hypertension     Morbid obesity ([facility] )     Myocardial infarction ([facility] )     Plantar fascial fibromatosis     PVD (peripheral vascular disease) ([facility] )     Sleep apnea        PAST SURGICAL HISTORY:  Past Surgical History:   Procedure Laterality Date    CARDIAC SURGERY      cardiac stents x 8    COLONOSCOPY      colon polyps    HERNIA REPAIR      umb x 2    ME COLONOSCOPY FLX DX W/COLLJ SPEC WHEN PFRMD N/A 1/2/2019    Procedure: COLONOSCOPY;  Surgeon: Christine Gomez MD;  [facility];  Service: Gastroenterology    ME ESOPHAGOGASTRODUODENOSCOPY TRANSORAL DIAGNOSTIC N/A 1/2/2019    Procedure: ESOPHAGOGASTRODUODENOSCOPY (EGD); Surgeon: Christine Gomez MD;  [facility];  Service: Gastroenterology    TUBAL LIGATION          ALLERGIES:  Penicillins    MEDICATIONS:  Current Outpatient Medications   Medication Sig Dispense Refill    amLODIPine (NORVASC) 5 mg tablet Take 1 tablet by mouth daily        aspirin 81 MG tablet Take 1 tablet by mouth daily      atorvastatin (LIPITOR) 80 mg tablet Take 1 tablet by mouth daily at bedtime        carvedilol (COREG) 25 mg tablet Take 1 tablet by mouth 2 (two) times a day      cetirizine (ZyrTEC) 10 mg tablet Take 10 mg by mouth daily as needed        fluticasone (FLONASE) 50 mcg/act nasal spray 2 sprays into each nostril daily as needed      furosemide (LASIX) 20 mg tablet Take 40 mg by mouth daily as needed        glipiZIDE (GLUCOTROL) 5 mg tablet Take 1 tablet by mouth 2 (two) times a day      ketotifen (ZADITOR) 0 025 % ophthalmic solution Administer 1 drop to both eyes as needed      Lancets (ONETOUCH ULTRASOFT) lancets by Does not apply route      lansoprazole (PREVACID) 30 mg capsule Take 1 capsule by mouth daily      levothyroxine 50 mcg tablet Take 50 mcg by mouth daily        losartan (COZAAR) 100 MG tablet Take 1 tablet by mouth daily      metFORMIN (GLUCOPHAGE) 1000 MG tablet Take 1 tablet by mouth 2 (two) times a day      mometasone (ASMANEX 30 METERED DOSES) 220 MCG/INH inhaler Inhale as needed        polyethylene glycol (GOLYTELY) 4000 mL solution At 6:00 pm the day prior to scheduled capsule procedure, mix the gallon of Golytely as directed  Drink ONLY ONE HALF gallon of Golytely   4000 mL 0    potassium chloride (KLOR-CON M20) 20 mEq tablet Take by mouth 2 (two) times a day as needed        prasugrel (EFFIENT) tablet Take 1 tablet (10 mg total) by mouth daily 90 tablet 4    terazosin (HYTRIN) 5 mg capsule Take 2 capsules by mouth daily at bedtime        traMADol (ULTRAM) 50 mg tablet Take 1 tablet by mouth every 6 (six) hours as needed      ferrous sulfate 325 (65 Fe) mg tablet Take 1 tablet by mouth daily with breakfast        nitroglycerin (NITROSTAT) 0 4 mg SL tablet Place 1 tablet (0 4 mg total) under the tongue every 5 (five) minutes as needed for chest pain (Patient not taking: Reported on 2020) 90 tablet 3     No current facility-administered medications for this visit  SOCIAL HISTORY:  Social History     Socioeconomic History    Marital status: /Civil Union     Spouse name: None    Number of children: None    Years of education: None    Highest education level: None   Occupational History    None   Social Needs    Financial resource strain: None    Food insecurity     Worry: None     Inability: None    Transportation needs     Medical: None     Non-medical: None   Tobacco Use    Smoking status: Former Smoker     Packs/day: 1 50     Years: 30 00     Pack years: 45 00     Last attempt to quit:      Years since quittin 6    Smokeless tobacco: Never Used   Substance and Sexual Activity    Alcohol use:  Yes     Alcohol/week: 1 0 standard drinks     Types: 1 Glasses of wine per week     Comment: rare-2 x/ year    Drug use: No    Sexual activity: None   Lifestyle    Physical activity     Days per week: None     Minutes per session: None    Stress: None   Relationships    Social connections     Talks on phone: None     Gets together: None     Attends Shinto service: None     Active member of club or organization: None     Attends meetings of clubs or organizations: None     Relationship status: None    Intimate partner violence     Fear of current or ex partner: None     Emotionally abused: None     Physically abused: None     Forced sexual activity: None   Other Topics Concern    None   Social History Narrative    None        REVIEW OF SYSTEMS:  GENERAL: NAD, afebrile  HEART: No chest pain, or palpitation  RESPIRATORY:  No acute SOB or cough  GI: No Nausea, vomit or diarrhea  NEUROLOGIC: No syncope or acute weakness    PHYSICAL EXAM:  VASCULAR EXAM  Dorsalis pedis  +1, Posterior tibial artery  absent  The patient has class findings with skin atrophy, lack of digital hair, and nail dystrophy  There is +2 lower extremity edema bilaterally  Venous stasis skin changes noted BLE  NEUROLOGIC EXAM  Sensation is intact to light touch  Sensation is intact to 10gm monofilament  No focal neurologic deficit  DERMATOLOGIC EXAM:   No ulcer or cellulitis noted  The patient has hypertrophic toenails X 6 with discoloration, onycholysis, and subungal debris  No notable skin lesion  Patient has hyperkeratosis in right submet 5  MUSCULOSKELETAL EXAM:   No acute joint pain, edema, or redness  No acute musculoskeletal problem  Patient has deformity including hammertoe

## 2020-09-18 ENCOUNTER — TELEPHONE (OUTPATIENT)
Dept: GASTROENTEROLOGY | Facility: CLINIC | Age: 70
End: 2020-09-18

## 2020-09-18 NOTE — TELEPHONE ENCOUNTER
Spoke to pt she would like a cb in a few weeks, has not decided if she would like to come in yet      ce         Can call patient and see if she wants to come in for visit to evaluate follow up on blood loss - Zamzam Starks

## 2020-10-09 NOTE — TELEPHONE ENCOUNTER
Spoke with pt, she does not wish to schedule at this time, she will cb to sched when she is ready  tremaine

## 2020-10-19 ENCOUNTER — OFFICE VISIT (OUTPATIENT)
Dept: PODIATRY | Facility: CLINIC | Age: 70
End: 2020-10-19
Payer: COMMERCIAL

## 2020-10-19 VITALS — BODY MASS INDEX: 46.61 KG/M2 | TEMPERATURE: 97.4 F | HEIGHT: 66 IN | WEIGHT: 290 LBS

## 2020-10-19 DIAGNOSIS — E11.40 TYPE 2 DIABETES MELLITUS WITH DIABETIC NEUROPATHY, WITHOUT LONG-TERM CURRENT USE OF INSULIN (HCC): Primary | ICD-10-CM

## 2020-10-19 DIAGNOSIS — I73.9 PERIPHERAL VASCULAR DISEASE, UNSPECIFIED (HCC): ICD-10-CM

## 2020-10-19 DIAGNOSIS — B35.1 ONYCHOMYCOSIS: ICD-10-CM

## 2020-10-19 DIAGNOSIS — L85.1 ACQUIRED KERATODERMA: ICD-10-CM

## 2020-10-19 PROCEDURE — 11055 PARING/CUTG B9 HYPRKER LES 1: CPT | Performed by: PODIATRIST

## 2020-10-19 PROCEDURE — 11721 DEBRIDE NAIL 6 OR MORE: CPT | Performed by: PODIATRIST

## 2020-10-19 RX ORDER — NITROGLYCERIN 0.4 MG/1
TABLET SUBLINGUAL EVERY 24 HOURS
COMMUNITY
End: 2021-01-13 | Stop reason: SDUPTHER

## 2020-10-19 RX ORDER — BLOOD SUGAR DIAGNOSTIC
STRIP MISCELLANEOUS
COMMUNITY
Start: 2020-07-24

## 2020-12-02 ENCOUNTER — TRANSCRIBE ORDERS (OUTPATIENT)
Dept: ADMINISTRATIVE | Facility: HOSPITAL | Age: 70
End: 2020-12-02

## 2020-12-02 DIAGNOSIS — Z12.31 SCREENING MAMMOGRAM FOR HIGH-RISK PATIENT: Primary | ICD-10-CM

## 2020-12-30 ENCOUNTER — HOSPITAL ENCOUNTER (OUTPATIENT)
Dept: MAMMOGRAPHY | Facility: IMAGING CENTER | Age: 70
Discharge: HOME | End: 2020-12-30
Payer: COMMERCIAL

## 2020-12-30 VITALS — WEIGHT: 209 LBS | HEIGHT: 63 IN | BODY MASS INDEX: 37.03 KG/M2

## 2020-12-30 DIAGNOSIS — Z12.31 SCREENING MAMMOGRAM FOR HIGH-RISK PATIENT: ICD-10-CM

## 2020-12-30 DIAGNOSIS — Z12.31 VISIT FOR SCREENING MAMMOGRAM: ICD-10-CM

## 2020-12-30 PROCEDURE — 77067 SCR MAMMO BI INCL CAD: CPT

## 2020-12-30 PROCEDURE — 77063 BREAST TOMOSYNTHESIS BI: CPT

## 2021-01-06 ENCOUNTER — OFFICE VISIT (OUTPATIENT)
Dept: PODIATRY | Facility: CLINIC | Age: 71
End: 2021-01-06
Payer: COMMERCIAL

## 2021-01-06 VITALS
BODY MASS INDEX: 37.03 KG/M2 | DIASTOLIC BLOOD PRESSURE: 80 MMHG | HEIGHT: 63 IN | WEIGHT: 209 LBS | SYSTOLIC BLOOD PRESSURE: 180 MMHG | HEART RATE: 80 BPM

## 2021-01-06 DIAGNOSIS — I73.9 PERIPHERAL VASCULAR DISEASE, UNSPECIFIED (HCC): ICD-10-CM

## 2021-01-06 DIAGNOSIS — B35.1 ONYCHOMYCOSIS: ICD-10-CM

## 2021-01-06 DIAGNOSIS — L85.1 ACQUIRED KERATODERMA: ICD-10-CM

## 2021-01-06 DIAGNOSIS — E11.40 TYPE 2 DIABETES MELLITUS WITH DIABETIC NEUROPATHY, WITHOUT LONG-TERM CURRENT USE OF INSULIN (HCC): Primary | ICD-10-CM

## 2021-01-06 PROCEDURE — 11721 DEBRIDE NAIL 6 OR MORE: CPT | Performed by: PODIATRIST

## 2021-01-06 PROCEDURE — 11056 PARNG/CUTG B9 HYPRKR LES 2-4: CPT | Performed by: PODIATRIST

## 2021-01-06 RX ORDER — AMMONIUM LACTATE 12 G/100G
CREAM TOPICAL AS NEEDED
Qty: 385 G | Refills: 3 | Status: SHIPPED | OUTPATIENT
Start: 2021-01-06

## 2021-01-06 NOTE — PROGRESS NOTES
PATIENT:  Wilbert Panda  1950    ASSESSMENT/PLAN:  1  Type 2 diabetes mellitus with diabetic neuropathy, without long-term current use of insulin (HCC)  ammonium lactate (LAC-HYDRIN) 12 % cream   2  Peripheral vascular disease, unspecified (HCC)  ammonium lactate (LAC-HYDRIN) 12 % cream   3  Onychomycosis  Debridement   4  Acquired keratoderma  Lesion Destruction          Disease prevention and related risk factors of diabetes were identified and discussed  The patient was educated in proper foot wear for diabetics  Also educated in daily foot assessment and routine diabetic foot care  Educated management of LE edema with compression, elevation, and diet  The patient will follow up in 9 weeks for further diabetic foot exam and care     PROCEDURE:  All mycotic toenails were reduced and debrided in length, width, and girth using a nail nipper and dremel  All hyperkeratotic skin lesion(s) were sharply pared with a scalpel with no bleeding or evidence of ulceration  Patient tolerated procedure(s) well without complications  HPI:  Wilbert Panda is a 78 y  o year old female seen for diabetic foot exam   The patient has class findings and DPN  Mild tingling sensation in her feet  No foot ulcer or infection  BS has been up a little  She complained of thick nails and calluses  The patient denied any acute pedal disorder or injury          PAST MEDICAL HISTORY:  Past Medical History:   Diagnosis Date    AAA [facility]ab[facility]     Anemia     Arthritis     Coronary artery disease     Diabetes mellitus ([address]     Disease of thyroid gland     GERD (gastroesophageal reflux disease)     Hypertension     Morbid obesity ([address]     Myocardial infarction ([facility] [address] [facility]     Plantar fascial fibromatosis     PVD (peripheral vascular disease) ([facility] [address] )     Sleep apnea        PAST SURGICAL HISTORY:  Past Surgical History:   Procedure Laterality Date    CARDIAC SURGERY      cardiac stents x 8  COLONOSCOPY      colon polyps    HERNIA REPAIR      umb x 2    [place] COLONOSCOPY FLX DX W/COLLJ SPEC WHEN PFRMD N/A 1/2/2019    Procedure: COLONOSCOPY;  Surgeon: Ulices Guzman MD;  [facility];  Service: Gastroenterology    [place] ESOPHAGOGASTRODUODENOSCOPY TRANSORAL DIAGNOSTIC N/A 1/2/2019    Procedure: ESOPHAGOGASTRODUODENOSCOPY (EGD);   Surgeon: Ulices Guzman MD;  [facility];  Service: Gastroenterology    TUBAL LIGATION          ALLERGIES:  Penicillins and Lisinopril    MEDICATIONS:  Current Outpatient Medications   Medication Sig Dispense Refill    amLODIPine (NORVASC) 5 mg tablet Take 1 tablet by mouth daily        aspirin 81 MG tablet Take 1 tablet by mouth daily      atorvastatin (LIPITOR) 80 mg tablet Take 1 tablet by mouth daily at bedtime        carvedilol (COREG) 25 mg tablet Take 1 tablet by mouth 2 (two) times a day      cetirizine (ZyrTEC) 10 mg tablet Take 10 mg by mouth daily as needed        ferrous sulfate 325 (65 Fe) mg tablet Take 1 tablet by mouth daily with breakfast        fluticasone (FLONASE) 50 mcg/act nasal spray 2 sprays into each nostril daily as needed      furosemide (LASIX) 20 mg tablet Take 40 mg by mouth daily as needed        glipiZIDE (GLUCOTROL) 5 mg tablet Take 1 tablet by mouth 2 (two) times a day      ketotifen (ZADITOR) 0 025 % ophthalmic solution Administer 1 drop to both eyes as needed      Lancets (ONETOUCH ULTRASOFT) lancets by Does not apply route      lansoprazole (PREVACID) 30 mg capsule Take 1 capsule by mouth daily      levothyroxine 50 mcg tablet Take 50 mcg by mouth daily        losartan (COZAAR) 100 MG tablet Take 1 tablet by mouth daily      metFORMIN (GLUCOPHAGE) 1000 MG tablet Take 1 tablet by mouth 2 (two) times a day      mometasone (ASMANEX 30 METERED DOSES) 220 MCG/INH inhaler Inhale as needed        nitroglycerin (NITROSTAT) 0 4 mg SL tablet Place 1 tablet (0 4 mg total) under the tongue every 5 (five) minutes as needed for chest pain 90 tablet 3    nitroglycerin (NITROSTAT) 0 4 mg SL tablet every 24 hours      OneTouch Ultra test strip       polyethylene glycol (GOLYTELY) 4000 mL solution At 6:00 pm the day prior to scheduled capsule procedure, mix the gallon of Golytely as directed  Drink ONLY ONE HALF gallon of Golytely  4000 mL 0    potassium chloride (KLOR-CON M20) 20 mEq tablet Take by mouth 2 (two) times a day as needed        prasugrel (EFFIENT) tablet Take 1 tablet (10 mg total) by mouth daily 90 tablet 4    terazosin (HYTRIN) 5 mg capsule Take 2 capsules by mouth daily at bedtime        traMADol (ULTRAM) 50 mg tablet Take 1 tablet by mouth every 6 (six) hours as needed      ammonium lactate (LAC-HYDRIN) 12 % cream Apply topically as needed for dry skin 385 g 3     No current facility-administered medications for this visit  SOCIAL HISTORY:  Social History     Socioeconomic History    Marital status: /Civil Union     Spouse name: None    Number of children: None    Years of education: None    Highest education level: None   Occupational History    None   Social Needs    Financial resource strain: None    Food insecurity     Worry: None     Inability: None    Transportation needs     Medical: None     Non-medical: None   Tobacco Use    Smoking status: Former Smoker     Packs/day: 1 50     Years: 30 00     Pack years: 45 00     Quit date:      Years since quittin 0    Smokeless tobacco: Never Used   Substance and Sexual Activity    Alcohol use:  Yes     Alcohol/week: 1 0 standard drinks     Types: 1 Glasses of wine per week     Comment: rare-2 x/ year    Drug use: No    Sexual activity: None   Lifestyle    Physical activity     Days per week: None     Minutes per session: None    Stress: None   Relationships    Social connections     Talks on phone: None     Gets together: None     Attends [employer] service: None     Active member of club or organization: None     Attends meetings of clubs or organizations: None     Relationship status: None    Intimate partner violence     Fear of current or ex partner: None     Emotionally abused: None     Physically abused: None     Forced sexual activity: None   Other Topics Concern    None   Social History Narrative    None        REVIEW OF SYSTEMS:  GENERAL: NAD, afebrile  HEART: No chest pain, or palpitation  RESPIRATORY:  No acute SOB or cough  GI: No Nausea, vomit or diarrhea  NEUROLOGIC: No syncope or acute weakness    PHYSICAL EXAM:  VASCULAR EXAM  Dorsalis pedis  +1, Posterior tibial artery  absent  The patient has class findings with skin atrophy, lack of digital hair, and nail dystrophy  There is +2 lower extremity edema bilaterally  Venous stasis skin changes noted BLE  NEUROLOGIC EXAM  Sensation is intact to light touch  Sensation is intact to 10gm monofilament  No focal neurologic deficit  DERMATOLOGIC EXAM:   No ulcer or cellulitis noted  The patient has hypertrophic toenails X 6 with discoloration, onycholysis, and subungal debris  No notable skin lesion  Patient has hyperkeratosis X 2 in bilateral submet 5  MUSCULOSKELETAL EXAM:   No acute joint pain, edema, or redness  No acute musculoskeletal problem  Patient has deformity including hammertoe  Diabetic Foot Exam    Patient's shoes and socks removed  Right Foot/Ankle   Right Foot Inspection  Skin Exam: skin normal, skin intact, dry skin, callus and callus no warmth, no erythema, no maceration, no abnormal color, no pre-ulcer and no ulcer                          Toe Exam: right toe deformityno swelling, no tenderness and erythema  Sensory   Vibration: diminished  Proprioception: intact   Monofilament testing: intact  Vascular  Capillary refills: < 3 seconds  The right DP pulse is 1+  The right PT pulse is 0       Left Foot/Ankle  Left Foot Inspection  Skin Exam: skin normal, skin intact, dry skin and callusno warmth, no erythema, no maceration, normal color, no pre-ulcer and no ulcer                         Toe Exam: left toe deformityno swelling, no tenderness and no erythema                   Sensory   Vibration: diminished  Proprioception: intact  Monofilament: intact  Vascular  Capillary refills: < 3 seconds  The left DP pulse is 1+  The left PT pulse is 0  Assign Risk Category:  Deformity present; No loss of protective sensation;  No weak pulses       Risk: 1

## 2021-01-13 ENCOUNTER — OFFICE VISIT (OUTPATIENT)
Dept: CARDIOLOGY CLINIC | Facility: CLINIC | Age: 71
End: 2021-01-13
Payer: COMMERCIAL

## 2021-01-13 VITALS
BODY MASS INDEX: 51.56 KG/M2 | DIASTOLIC BLOOD PRESSURE: 78 MMHG | HEART RATE: 68 BPM | WEIGHT: 291 LBS | HEIGHT: 63 IN | SYSTOLIC BLOOD PRESSURE: 140 MMHG

## 2021-01-13 DIAGNOSIS — I10 HYPERTENSION, ESSENTIAL, BENIGN: ICD-10-CM

## 2021-01-13 DIAGNOSIS — E78.00 HYPERCHOLESTEROLEMIA: ICD-10-CM

## 2021-01-13 DIAGNOSIS — I65.23 CAROTID ARTERY STENOSIS, ASYMPTOMATIC, BILATERAL: ICD-10-CM

## 2021-01-13 DIAGNOSIS — I71.4 ABDOMINAL AORTIC ANEURYSM (AAA) WITHOUT RUPTURE (HCC): ICD-10-CM

## 2021-01-13 DIAGNOSIS — I25.10 CORONARY ARTERY DISEASE INVOLVING NATIVE CORONARY ARTERY OF NATIVE HEART WITHOUT ANGINA PECTORIS: Primary | ICD-10-CM

## 2021-01-13 PROCEDURE — 99214 OFFICE O/P EST MOD 30 MIN: CPT | Performed by: INTERNAL MEDICINE

## 2021-01-13 PROCEDURE — 93000 ELECTROCARDIOGRAM COMPLETE: CPT | Performed by: INTERNAL MEDICINE

## 2021-01-13 NOTE — PROGRESS NOTES
Cardiology Follow Up    [address] [place] [address]  1950  [id number]  [address] [facility] [address] [address] [phone]    1  Coronary artery disease involving native coronary artery of native heart without angina pectoris  POCT ECG   2  Abdominal aortic aneurysm (AAA) without rupture (HCC)     3  Carotid artery stenosis, asymptomatic, bilateral     4  Hypertension, essential, benign     5  Hypercholesterolemia         Interval History:  Cardiology follow-up  Patient doing well from a cardiac point of view, she is not very active because of DJD and knee arthritis  No chest pain or dyspnea, denies any orthopnea or PND  Denies any focal neurological deficits denies any amaurosis fugax, compliant with low-sodium diet, blood pressures been well control  No significant bleeding issues on chronic dual antiplatelet therapy with prasugrel  Lipids last year total cholesterol 127 HDL 51 LDL 59  No recent lipid profile, she is due for 1       Patient Active Problem List   Diagnosis    CHF (congestive heart failure), NYHA class I, chronic, diastolic (HCC)    Coronary artery disease involving native coronary artery of native heart without angina pectoris    Hypercholesterolemia    Abdominal aortic aneurysm (AAA) without rupture ([address] )    Carotid artery stenosis, asymptomatic, bilateral    Hypertension, essential, benign     Past Medical History:   Diagnosis Date    AAA (abdominal aortic aneurysm) ([address] )     Anemia     Arthritis     Coronary artery disease     Diabetes mellitus ([address]     Disease of thyroid gland     GERD (gastroesophageal reflux disease)     Hypertension     Morbid obesity ([address]     Myocardial infarction ([address]     Plantar fascial fibromatosis     PVD (peripheral vascular disease) ([address]     Sleep apnea      Social History     Socioeconomic History    Marital status: /Civil Union     Spouse name: Not on file    Number of children: Not on file    Years of education: Not on file    Highest education level: Not on file   Occupational History    Not on file   Social Needs    Financial resource strain: Not on file    Food insecurity     Worry: Not on file     Inability: Not on file    Transportation needs     Medical: Not on file     Non-medical: Not on file   Tobacco Use    Smoking status: Former Smoker     Packs/day: 1 50     Years: 30 00     Pack years: 45 00     Quit date:      Years since quittin 0    Smokeless tobacco: Never Used   Substance and Sexual Activity    Alcohol use:  Yes     Alcohol/week: 1 0 standard drinks     Types: 1 Glasses of wine per week     Comment: rare-2 x/ year    Drug use: No    Sexual activity: Not on file   Lifestyle    Physical activity     Days per week: Not on file     Minutes per session: Not on file    Stress: Not on file   Relationships    Social connections     Talks on phone: Not on file     Gets together: Not on file     Attends Yarsani service: Not on file     Active member of club or organization: Not on file     Attends meetings of clubs or organizations: Not on file     Relationship status: Not on file    Intimate partner violence     Fear of current or ex partner: Not on file     Emotionally abused: Not on file     Physically abused: Not on file     Forced sexual activity: Not on file   Other Topics Concern    Not on file   Social History Narrative    Not on file      Family History   Problem Relation Age of Onset    Cancer Mother     Heart disease Father     Diabetes Father     Heart disease Sister     Heart disease Brother     Hypertension Family     Arthritis Family     No Known Problems Daughter     No Known Problems Maternal Grandmother     No Known Problems Maternal Grandfather     No Known Problems Paternal Grandmother     No Known Problems Paternal Grandfather     No Known Problems Sister     No Known Problems Sister     No Known Problems Daughter     No Known Problems Maternal Aunt     No Known Problems Maternal Aunt     No Known Problems Maternal Aunt     Breast cancer Maternal Aunt     Colon polyps Neg Hx     Colon cancer Neg Hx      Past Surgical History:   Procedure Laterality Date    CARDIAC SURGERY      cardiac stents x 8    COLONOSCOPY      colon polyps    HERNIA REPAIR      umb x 2    [place] COLONOSCOPY FLX DX W/COLLJ SPEC WHEN PFRMD N/A 1/2/2019    Procedure: COLONOSCOPY;  Surgeon: Chicho Dixon MD;  [facility];  Service: Gastroenterology    [place] ESOPHAGOGASTRODUODENOSCOPY TRANSORAL DIAGNOSTIC N/A 1/2/2019    Procedure: ESOPHAGOGASTRODUODENOSCOPY (EGD);   Surgeon: Chicho Dixon MD;  [facility];  Service: Gastroenterology    TUBAL LIGATION         Current Outpatient Medications:     amLODIPine (NORVASC) 5 mg tablet, Take 1 tablet by mouth daily  , Disp: , Rfl:     ammonium lactate (LAC-HYDRIN) 12 % cream, Apply topically as needed for dry skin, Disp: 385 g, Rfl: 3    aspirin 81 MG tablet, Take 1 tablet by mouth daily, Disp: , Rfl:     atorvastatin (LIPITOR) 80 mg tablet, Take 1 tablet by mouth daily at bedtime  , Disp: , Rfl:     carvedilol (COREG) 25 mg tablet, Take 1 tablet by mouth 2 (two) times a day, Disp: , Rfl:     fluticasone (FLONASE) 50 mcg/act nasal spray, 2 sprays into each nostril daily as needed, Disp: , Rfl:     furosemide (LASIX) 20 mg tablet, Take 20 mg by mouth daily as needed , Disp: , Rfl:     glipiZIDE (GLUCOTROL) 5 mg tablet, Take 1 tablet by mouth 2 (two) times a day, Disp: , Rfl:     ketotifen (ZADITOR) 0 025 % ophthalmic solution, Administer 1 drop to both eyes as needed, Disp: , Rfl:     Lancets (ONETOUCH ULTRASOFT) lancets, by Does not apply route, Disp: , Rfl:     lansoprazole (PREVACID) 30 mg capsule, Take 1 capsule by mouth daily, Disp: , Rfl:     levothyroxine 50 mcg tablet, Take 50 mcg by mouth daily  , Disp: , Rfl:     losartan (COZAAR) 100 MG tablet, Take 1 tablet by mouth daily, Disp: , Rfl:     metFORMIN (GLUCOPHAGE) 1000 MG tablet, Take 1 tablet by mouth 2 (two) times a day, Disp: , Rfl:     mometasone (ASMANEX 30 METERED DOSES) 220 MCG/INH inhaler, Inhale as needed  , Disp: , Rfl:     OneTouch Ultra test strip, , Disp: , Rfl:     potassium chloride (KLOR-CON M20) 20 mEq tablet, Take by mouth 2 (two) times a day as needed  , Disp: , Rfl:     prasugrel (EFFIENT) tablet, Take 1 tablet (10 mg total) by mouth daily, Disp: 90 tablet, Rfl: 4    terazosin (HYTRIN) 5 mg capsule, Take 2 capsules by mouth daily at bedtime  , Disp: , Rfl:     traMADol (ULTRAM) 50 mg tablet, Take 1 tablet by mouth every 6 (six) hours as needed, Disp: , Rfl:     cetirizine (ZyrTEC) 10 mg tablet, Take 10 mg by mouth daily as needed  , Disp: , Rfl:     ferrous sulfate 325 (65 Fe) mg tablet, Take 1 tablet by mouth daily with breakfast  , Disp: , Rfl:     nitroglycerin (NITROSTAT) 0 4 mg SL tablet, Place 1 tablet (0 4 mg total) under the tongue every 5 (five) minutes as needed for chest pain, Disp: 90 tablet, Rfl: 3    polyethylene glycol (GOLYTELY) 4000 mL solution, At 6:00 pm the day prior to scheduled capsule procedure, mix the gallon of Golytely as directed  Drink ONLY ONE HALF gallon of Golytely  (Patient not taking: Reported on 1/13/2021), Disp: 4000 mL, Rfl: 0  Allergies   Allergen Reactions    Penicillins Anaphylaxis    Lisinopril Itching       Labs:  No visits with results within 6 Month(s) from this visit  Latest known visit with results is:   Office Visit on 01/22/2020   Component Date Value    Cholesterol, Total 01/30/2020 127     Triglycerides 01/30/2020 84     HDL 01/30/2020 51     VLDL Cholesterol Calcula* 01/30/2020 17     LDL Calculated 01/30/2020 59     LDl/HDL Ratio 01/30/2020 1 2      Imaging: Mammo Screening Bilateral W 3d & Cad    Result Date: 12/30/2020  Narrative: DIAGNOSIS: Screening mammogram for high-risk patient;  Visit for screening mammogram TECHNIQUE: Digital screening mammography was performed  Computer Aided Detection (CAD) analyzed all applicable images  COMPARISONS: Prior breast imaging dated: 02/05/2018 and 11/20/2015 RELEVANT HISTORY: Family Breast Cancer History: History of breast cancer in Maternal Aunt  Family Medical History: Family medical history includes breast cancer in maternal aunt  Personal History: No known relevant hormone history  No known relevant surgical history  No known relevant medical history  The patient is scheduled in a reminder system for screening mammography  8-10% of cancers will be missed on mammography  Management of a palpable abnormality must be based on clinical grounds  Patients will be notified of their results via letter from our facility  Accredited by [employer] and FDA  RISK ASSESSMENT: 5 Year Tyrer-Cuzick: 0 76 % 10 Year Tyrer-Cuzick: 1 56 % Lifetime Tyrer-Cuzick: 2 83 % TISSUE DENSITY: The breasts are almost entirely fatty  INDICATION: Coco Claire is a 78 y o  female presenting for screening mammography  FINDINGS: Bilateral There are no suspicious masses, grouped microcalcifications or areas of architectural distortion  The skin and nipple areolar complex are unremarkable  Stable scattered calcifications  Impression: No mammographic evidence of malignancy  ASSESSMENT/BI-RADS CATEGORY: Left: 2 - Benign Right: 2 - Benign Overall: 2 - Benign RECOMMENDATION:      - Routine screening mammogram in 1 year for both breasts  Workstation ID: [id number]       Review of Systems:  Review of Systems   Constitutional: Negative for activity change, fatigue and unexpected weight change  HENT: Negative for hearing loss and nosebleeds  Eyes: Negative for visual disturbance  Respiratory: Negative for apnea, shortness of breath, wheezing and stridor  Cardiovascular: Negative for chest pain, palpitations and leg swelling     Gastrointestinal: Negative for abdominal pain, anal bleeding and blood in stool  Endocrine: Negative for cold intolerance  Genitourinary: Negative for hematuria  Musculoskeletal: Positive for arthralgias, back pain and gait problem  Negative for myalgias  Skin: Positive for pallor  Negative for rash  Allergic/Immunologic: Negative for immunocompromised state  Neurological: Negative for dizziness, syncope, speech difficulty and weakness  Hematological: Bruises/bleeds easily  Psychiatric/Behavioral: Negative for sleep disturbance  The patient is not nervous/anxious  Physical Exam:  Physical Exam  Vitals signs reviewed  Constitutional:       General: She is not in acute distress  Appearance: She is well-developed  She is obese  She is not ill-appearing, toxic-appearing or diaphoretic  Neck:      Vascular: No hepatojugular reflux or JVD  Trachea: No tracheal deviation  Cardiovascular:      Rate and Rhythm: Normal rate and regular rhythm  No extrasystoles are present  Pulses: Decreased pulses  Carotid pulses are on the right side with bruit  Heart sounds: No murmur  No friction rub  No gallop  Pulmonary:      Effort: Pulmonary effort is normal  No tachypnea, bradypnea, accessory muscle usage or respiratory distress  Breath sounds: Normal breath sounds  No stridor  No decreased breath sounds, wheezing, rhonchi or rales  Musculoskeletal:      Right lower leg: No edema  Left lower leg: Edema present  Skin:     General: Skin is warm and dry  Capillary Refill: Capillary refill takes less than 2 seconds  Coloration: Skin is not cyanotic  Findings: No ecchymosis  Nails: There is no clubbing  Neurological:      General: No focal deficit present  Mental Status: She is alert  Psychiatric:         Mood and Affect: Mood normal          Discussion/Summary:  Coronary artery disease PTCA/drug stent of the RCA    Recurrent in stent restenosis, she does have today's stent, last catheterization on 05/18, suboptimal result with 40% residual despite very high-pressure inflations  Clinically stable  Echocardiogram that time revealed normal left systolic function with left hypertrophy and stage I diastolic dysfunction, there was mild-to-moderate tricuspid insufficiency with upper limits of normal pulmonary pressures suggested by Doppler criteria  Lipids well controlled current medical regimen  Vascular disease, infrarenal abdominal aortic aneurysm 3 cm on ultrasound 2018, follow-up last year revealed measurements 3 2 cm mostly unchanged  She does have incidental moderate disease in the iliac system  A possible the SMA as well  Carotid disease, 50-69% right and less than 50 on the left, unchanged on duplex last year  Continue dual antiplatelet therapy, currently on prasugrel, she does have some issues with insurance issues , will switch her to Plavix after the next refill  continue lipid-lowering therapy  This note was completed in part utilizing [employer] direct voice recognition software  Grammatical errors, random word insertion, spelling mistakes, and incomplete sentences may be an occasional consequence of the system secondary to software limitations, ambient noise and hardware issues  At the time of dictation, efforts were made to edit, clarify and /or correct errors  Please read the chart carefully and recognize, using context, where substitutions have occurred  If you have any questions or concerns about the context, text or information contained within the body of this dictation, please contact myself, the provider, for further clarification

## 2021-03-01 DIAGNOSIS — E78.00 HYPERCHOLESTEROLEMIA: Primary | ICD-10-CM

## 2021-03-01 RX ORDER — CLOPIDOGREL BISULFATE 75 MG/1
75 TABLET ORAL DAILY
COMMUNITY

## 2021-03-01 RX ORDER — CLOPIDOGREL BISULFATE 75 MG/1
75 TABLET ORAL DAILY
Qty: 90 TABLET | Refills: 3 | Status: SHIPPED | OUTPATIENT
Start: 2021-03-01

## 2021-03-05 ENCOUNTER — TELEPHONE (OUTPATIENT)
Dept: CARDIOLOGY CLINIC | Facility: CLINIC | Age: 71
End: 2021-03-05

## 2021-03-05 NOTE — TELEPHONE ENCOUNTER
----- Message from Raúl Fraser MD sent at 3/1/2021  3:06 PM EST -----  Regarding: RE: Plavix Dose? Discontinue prasugrel, start Plavix next day 75 mg p o  Daily  ----- Message -----  From: Alexandr Long MA  Sent: 3/1/2021   2:48 PM EST  To: Raúl Fraser MD  Subject: Plavix Dose? You stated you were going to start her on Plavix once she finished prior medication  Please advise on Dose and directions so I can order  Thank you

## 2021-03-10 ENCOUNTER — OFFICE VISIT (OUTPATIENT)
Dept: PODIATRY | Facility: CLINIC | Age: 71
End: 2021-03-10
Payer: COMMERCIAL

## 2021-03-10 VITALS
HEART RATE: 76 BPM | SYSTOLIC BLOOD PRESSURE: 167 MMHG | BODY MASS INDEX: 51.91 KG/M2 | DIASTOLIC BLOOD PRESSURE: 85 MMHG | WEIGHT: 293 LBS | HEIGHT: 63 IN

## 2021-03-10 DIAGNOSIS — B35.1 ONYCHOMYCOSIS: ICD-10-CM

## 2021-03-10 DIAGNOSIS — I73.9 PERIPHERAL VASCULAR DISEASE, UNSPECIFIED (HCC): ICD-10-CM

## 2021-03-10 DIAGNOSIS — L85.1 ACQUIRED KERATODERMA: ICD-10-CM

## 2021-03-10 DIAGNOSIS — E11.40 TYPE 2 DIABETES MELLITUS WITH DIABETIC NEUROPATHY, WITHOUT LONG-TERM CURRENT USE OF INSULIN (HCC): Primary | ICD-10-CM

## 2021-03-10 PROCEDURE — 11055 PARING/CUTG B9 HYPRKER LES 1: CPT | Performed by: PODIATRIST

## 2021-03-10 PROCEDURE — 11721 DEBRIDE NAIL 6 OR MORE: CPT | Performed by: PODIATRIST

## 2021-03-10 NOTE — PROGRESS NOTES
PATIENT:  Amarilis Chung  1950    ASSESSMENT/PLAN:  1  Type 2 diabetes mellitus with diabetic neuropathy, without long-term current use of insulin ([facility] )     2  Peripheral vascular disease, unspecified ([facility] )     3  Onychomycosis  Debridement   4  Acquired keratoderma  Debridement          Disease prevention and related risk factors of diabetes were identified and discussed  The patient was educated in proper foot wear for diabetics  Also educated in daily foot assessment and routine diabetic foot care  Continue ammonium lactate cream    Compression, elevation, and diet to control LE edema  The patient will follow up in 9 weeks for further diabetic foot exam and care  PROCEDURE:  All mycotic toenails were reduced and debrided in length, width, and girth using a nail nipper and dremel  All hyperkeratotic skin lesion(s) were sharply pared with a scalpel with no bleeding or evidence of ulceration  Patient tolerated procedure(s) well without complications  HPI:  Amarilis Chung is a 78 y  o year old female seen for diabetic foot exam   The patient has class findings and DPN  Mild tingling sensation in her feet  No foot ulcer or infection  She complained of hypertrophic nails and callus  The patient denied any acute pedal disorder or injury          PAST MEDICAL HISTORY:  Past Medical History:   Diagnosis Date    AAA (abdominal aortic aneurysm) (HCC)     Anemia     Arthritis     Coronary artery disease     Diabetes mellitus ([facility]     Disease of thyroid gland     GERD (gastroesophageal reflux disease)     Hypertension     Morbid obesity ([facility]     Myocardial infarction ([facility] )     Plantar fascial fibromatosis     PVD (peripheral vascular disease) ([facility] )     Sleep apnea        PAST SURGICAL HISTORY:  Past Surgical History:   Procedure Laterality Date    CARDIAC SURGERY      cardiac stents x 8    COLONOSCOPY      colon polyps    HERNIA REPAIR      umb x 2    AL COLONOSCOPY FLX DX W/COLLJ [facility] WHEN PFRMD N/A 1/2/2019    Procedure: COLONOSCOPY;  Surgeon: Odin Bolton MD;  [facility];  Service: Gastroenterology    FL ESOPHAGOGASTRODUODENOSCOPY TRANSORAL DIAGNOSTIC N/A 1/2/2019    Procedure: ESOPHAGOGASTRODUODENOSCOPY (EGD);   Surgeon: Odin Bolton MD;  [facility];  Service: Gastroenterology    TUBAL LIGATION          ALLERGIES:  Penicillins and Lisinopril    MEDICATIONS:  Current Outpatient Medications   Medication Sig Dispense Refill    amLODIPine (NORVASC) 5 mg tablet Take 1 tablet by mouth daily        ammonium lactate (LAC-HYDRIN) 12 % cream Apply topically as needed for dry skin 385 g 3    aspirin 81 MG tablet Take 1 tablet by mouth daily      atorvastatin (LIPITOR) 80 mg tablet Take 1 tablet by mouth daily at bedtime        carvedilol (COREG) 25 mg tablet Take 1 tablet by mouth 2 (two) times a day      cetirizine (ZyrTEC) 10 mg tablet Take 10 mg by mouth daily as needed        clopidogrel (PLAVIX) 75 mg tablet Take 1 tablet (75 mg total) by mouth daily 90 tablet 3    clopidogrel (PLAVIX) 75 mg tablet Take 75 mg by mouth daily      ferrous sulfate 325 (65 Fe) mg tablet Take 1 tablet by mouth daily with breakfast        fluticasone (FLONASE) 50 mcg/act nasal spray 2 sprays into each nostril daily as needed      furosemide (LASIX) 20 mg tablet Take 20 mg by mouth daily as needed       glipiZIDE (GLUCOTROL) 5 mg tablet Take 1 tablet by mouth 2 (two) times a day      ketotifen (ZADITOR) 0 025 % ophthalmic solution Administer 1 drop to both eyes as needed      Lancets (ONETOUCH ULTRASOFT) lancets by Does not apply route      lansoprazole (PREVACID) 30 mg capsule Take 1 capsule by mouth daily      levothyroxine 50 mcg tablet Take 50 mcg by mouth daily        losartan (COZAAR) 100 MG tablet Take 1 tablet by mouth daily      metFORMIN (GLUCOPHAGE) 1000 MG tablet Take 1 tablet by mouth 2 (two) times a day      mometasone (ASMANEX 30 METERED DOSES) 220 MCG/INH inhaler Inhale as needed        nitroglycerin (NITROSTAT) 0 4 mg SL tablet Place 1 tablet (0 4 mg total) under the tongue every 5 (five) minutes as needed for chest pain 90 tablet 3    OneTouch Ultra test strip       polyethylene glycol (GOLYTELY) 4000 mL solution At 6:00 pm the day prior to scheduled capsule procedure, mix the gallon of Golytely as directed  Drink ONLY ONE HALF gallon of Golytely  4000 mL 0    potassium chloride (KLOR-CON M20) 20 mEq tablet Take by mouth 2 (two) times a day as needed        terazosin (HYTRIN) 5 mg capsule Take 2 capsules by mouth daily at bedtime        traMADol (ULTRAM) 50 mg tablet Take 1 tablet by mouth every 6 (six) hours as needed       No current facility-administered medications for this visit  SOCIAL HISTORY:  Social History     Socioeconomic History    Marital status: /Civil Union     Spouse name: None    Number of children: None    Years of education: None    Highest education level: None   Occupational History    None   Social Needs    Financial resource strain: None    Food insecurity     Worry: None     Inability: None    Transportation needs     Medical: None     Non-medical: None   Tobacco Use    Smoking status: Former Smoker     Packs/day: 1 50     Years: 30 00     Pack years: 45 00     Quit date:      Years since quittin 2    Smokeless tobacco: Never Used   Substance and Sexual Activity    Alcohol use:  Yes     Alcohol/week: 1 0 standard drinks     Types: 1 Glasses of wine per week     Comment: rare-2 x/ year    Drug use: No    Sexual activity: None   Lifestyle    Physical activity     Days per week: None     Minutes per session: None    Stress: None   Relationships    Social connections     Talks on phone: None     Gets together: None     Attends Latter day service: None     Active member of club or organization: None     Attends meetings of clubs or organizations: None     Relationship status: None    Intimate partner violence     Fear of current or ex partner: None     Emotionally abused: None     Physically abused: None     Forced sexual activity: None   Other Topics Concern    None   Social History Narrative    None        REVIEW OF SYSTEMS:  GENERAL: NAD, afebrile  HEART: No chest pain, or palpitation  RESPIRATORY:  No acute SOB or cough  GI: No Nausea, vomit or diarrhea  NEUROLOGIC: No syncope or acute weakness    PHYSICAL EXAM:  VASCULAR EXAM  Dorsalis pedis  +1, Posterior tibial artery  absent  The patient has class findings with skin atrophy, lack of digital hair, and nail dystrophy  There is +2 lower extremity edema bilaterally  Venous stasis skin changes noted BLE  NEUROLOGIC EXAM  Sensation is intact to light touch  Sensation is intact to 10gm monofilament  No focal neurologic deficit  DERMATOLOGIC EXAM:   No ulcer or cellulitis noted  The patient has hypertrophic toenails X 6 with discoloration, onycholysis, and subungal debris  No notable skin lesion  Patient has hyperkeratosis in right submet 5  MUSCULOSKELETAL EXAM:   No acute joint pain, edema, or redness  No acute musculoskeletal problem  Patient has deformity including hammertoe

## 2021-05-19 ENCOUNTER — OFFICE VISIT (OUTPATIENT)
Dept: PODIATRY | Facility: CLINIC | Age: 71
End: 2021-05-19
Payer: COMMERCIAL

## 2021-05-19 VITALS
BODY MASS INDEX: 52.61 KG/M2 | DIASTOLIC BLOOD PRESSURE: 84 MMHG | HEIGHT: 63 IN | HEART RATE: 74 BPM | SYSTOLIC BLOOD PRESSURE: 183 MMHG

## 2021-05-19 DIAGNOSIS — I73.9 PERIPHERAL VASCULAR DISEASE, UNSPECIFIED (HCC): ICD-10-CM

## 2021-05-19 DIAGNOSIS — I89.0 LYMPHEDEMA: Primary | ICD-10-CM

## 2021-05-19 DIAGNOSIS — E11.40 TYPE 2 DIABETES MELLITUS WITH DIABETIC NEUROPATHY, WITHOUT LONG-TERM CURRENT USE OF INSULIN (HCC): ICD-10-CM

## 2021-05-19 DIAGNOSIS — L85.1 ACQUIRED KERATODERMA: ICD-10-CM

## 2021-05-19 DIAGNOSIS — B35.1 ONYCHOMYCOSIS: ICD-10-CM

## 2021-05-19 PROCEDURE — 11721 DEBRIDE NAIL 6 OR MORE: CPT | Performed by: PODIATRIST

## 2021-05-19 PROCEDURE — 11055 PARING/CUTG B9 HYPRKER LES 1: CPT | Performed by: PODIATRIST

## 2021-05-19 NOTE — PROGRESS NOTES
PATIENT:  Alice De  1950    ASSESSMENT/PLAN:  1  Lymphedema  Ambulatory referral to PT/OT lymphedema therapy   2  Onychomycosis  Debridement   3  Acquired keratoderma  Lesion Destruction   4  Type 2 diabetes mellitus with diabetic neuropathy, without long-term current use of insulin ([facility] )     5  Peripheral vascular disease, unspecified ([facility] )            Disease prevention and related risk factors of diabetes were identified and discussed  The patient was educated in proper foot wear for diabetics  Also educated in daily foot assessment and routine diabetic foot care  Compression, elevation, and diet to control LE edema  Referred to lymphedema management program   The patient will follow up in 9 weeks for further diabetic foot exam and care  PROCEDURE:  All mycotic toenails were reduced and debrided in length, width, and girth using a nail nipper and dremel  All hyperkeratotic skin lesion(s) were sharply pared with a scalpel with no bleeding or evidence of ulceration  Patient tolerated procedure(s) well without complications  HPI:  Alice De is a 78 y  o year old female seen for diabetic foot exam   The patient has class findings and DPN  Mild tingling sensation in her feet  No foot ulcer or infection  She complained of hypertrophic nails and callus  The patient denied any acute pedal disorder or injury  She has chronic LE edema  It has been worse since the last visit          PAST MEDICAL HISTORY:  Past Medical History:   Diagnosis Date    [facility]) [facility]     Anemia     Arthritis     Coronary artery disease     Diabetes mellitus [facility]     Disease of thyroid gland     GERD (gastroesophageal reflux disease)     Hypertension     Morbid obesity [facility]     [facility]ocardial infarction [facility]     Plantar fascial fibromatosis     PVD [facility]peripheral vascular disease) ([facility]     Sleep apnea        PAST SURGICAL HISTORY:  Past Surgical History:   Procedure Laterality Date    CARDIAC SURGERY      cardiac stents x 8    COLONOSCOPY      colon polyps    HERNIA REPAIR      umb x 2    NM COLONOSCOPY FLX DX W/COLLJ SPEC WHEN PFRMD N/A 1/2/2019    Procedure: COLONOSCOPY;  Surgeon: Juan Francisco Pappas MD;  [facility];  Service: Gastroenterology    NM ESOPHAGOGASTRODUODENOSCOPY TRANSORAL DIAGNOSTIC N/A 1/2/2019    Procedure: ESOPHAGOGASTRODUODENOSCOPY (EGD);   Surgeon: Juan Francisco Pappas MD;  [facility];  Service: Gastroenterology    TUBAL LIGATION          ALLERGIES:  Penicillins and Lisinopril    MEDICATIONS:  Current Outpatient Medications   Medication Sig Dispense Refill    amLODIPine (NORVASC) 5 mg tablet Take 1 tablet by mouth daily        ammonium lactate (LAC-HYDRIN) 12 % cream Apply topically as needed for dry skin 385 g 3    aspirin 81 MG tablet Take 1 tablet by mouth daily      atorvastatin (LIPITOR) 80 mg tablet Take 1 tablet by mouth daily at bedtime        carvedilol (COREG) 25 mg tablet Take 1 tablet by mouth 2 (two) times a day      cetirizine (ZyrTEC) 10 mg tablet Take 10 mg by mouth daily as needed        clopidogrel (PLAVIX) 75 mg tablet Take 1 tablet (75 mg total) by mouth daily 90 tablet 3    clopidogrel (PLAVIX) 75 mg tablet Take 75 mg by mouth daily      ferrous sulfate 325 (65 Fe) mg tablet Take 1 tablet by mouth daily with breakfast        fluticasone (FLONASE) 50 mcg/act nasal spray 2 sprays into each nostril daily as needed      furosemide (LASIX) 20 mg tablet Take 20 mg by mouth daily as needed       glipiZIDE (GLUCOTROL) 5 mg tablet Take 1 tablet by mouth 2 (two) times a day      ketotifen (ZADITOR) 0 025 % ophthalmic solution Administer 1 drop to both eyes as needed      Lancets (ONETOUCH ULTRASOFT) lancets by Does not apply route      lansoprazole (PREVACID) 30 mg capsule Take 1 capsule by mouth daily      levothyroxine 50 mcg tablet Take 50 mcg by mouth daily        losartan (COZAAR) 100 MG tablet Take 1 tablet by mouth daily      metFORMIN (GLUCOPHAGE) 1000 MG tablet Take 1 tablet by mouth 2 (two) times a day      mometasone (ASMANEX 30 METERED DOSES) 220 MCG/INH inhaler Inhale as needed        nitroglycerin (NITROSTAT) 0 4 mg SL tablet Place 1 tablet (0 4 mg total) under the tongue every 5 (five) minutes as needed for chest pain 90 tablet 3    OneTouch Ultra test strip       polyethylene glycol (GOLYTELY) 4000 mL solution At 6:00 pm the day prior to scheduled capsule procedure, mix the gallon of Golytely as directed  Drink ONLY ONE HALF gallon of Golytely  4000 mL 0    potassium chloride (KLOR-CON M20) 20 mEq tablet Take by mouth 2 (two) times a day as needed        terazosin (HYTRIN) 5 mg capsule Take 2 capsules by mouth daily at bedtime        traMADol (ULTRAM) 50 mg tablet Take 1 tablet by mouth every 6 (six) hours as needed       No current facility-administered medications for this visit  SOCIAL HISTORY:  Social History     Socioeconomic History    Marital status: /Civil Union     Spouse name: None    Number of children: None    Years of education: None    Highest education level: None   Occupational History    None   Social Needs    Financial resource strain: None    Food insecurity     Worry: None     Inability: None    Transportation needs     Medical: None     Non-medical: None   Tobacco Use    Smoking status: Former Smoker     Packs/day: 1 50     Years: 30 00     Pack years: 45 00     Quit date:      Years since quittin 3    Smokeless tobacco: Never Used   Substance and Sexual Activity    Alcohol use:  Yes     Alcohol/week: 1 0 standard drinks     Types: 1 Glasses of wine per week     Comment: rare-2 x/ year    Drug use: No    Sexual activity: None   Lifestyle    Physical activity     Days per week: None     Minutes per session: None    Stress: None   Relationships    Social connections     Talks on phone: None     Gets together: None     Attends Bahai service: None     Active member of club or organization: None     Attends meetings of clubs or organizations: None     Relationship status: None    Intimate partner violence     Fear of current or ex partner: None     Emotionally abused: None     Physically abused: None     Forced sexual activity: None   Other Topics Concern    None   Social History Narrative    None        REVIEW OF SYSTEMS:  GENERAL: NAD, afebrile  HEART: No chest pain, or palpitation  RESPIRATORY:  No acute SOB or cough  GI: No Nausea, vomit or diarrhea  NEUROLOGIC: No syncope or acute weakness    PHYSICAL EXAM:  VASCULAR EXAM  Dorsalis pedis  +1, Posterior tibial artery  absent  The patient has class findings with skin atrophy, lack of digital hair, and nail dystrophy  There is +2 lower extremity edema bilaterally  Venous stasis skin changes noted BLE  NEUROLOGIC EXAM  Sensation is intact to light touch  Sensation is intact to 10gm monofilament  No focal neurologic deficit  DERMATOLOGIC EXAM:   No ulcer or cellulitis noted  The patient has hypertrophic toenails X 6 with discoloration, onycholysis, and subungal debris  No notable skin lesion  Patient has hyperkeratosis in right submet 5  MUSCULOSKELETAL EXAM:   No acute joint pain, edema, or redness  No acute musculoskeletal problem  Patient has deformity including hammertoe

## 2021-07-21 ENCOUNTER — OFFICE VISIT (OUTPATIENT)
Dept: PODIATRY | Facility: CLINIC | Age: 71
End: 2021-07-21
Payer: COMMERCIAL

## 2021-07-21 VITALS — HEART RATE: 77 BPM | DIASTOLIC BLOOD PRESSURE: 82 MMHG | SYSTOLIC BLOOD PRESSURE: 178 MMHG

## 2021-07-21 DIAGNOSIS — I73.9 PERIPHERAL VASCULAR DISEASE, UNSPECIFIED (HCC): ICD-10-CM

## 2021-07-21 DIAGNOSIS — E11.40 TYPE 2 DIABETES MELLITUS WITH DIABETIC NEUROPATHY, WITHOUT LONG-TERM CURRENT USE OF INSULIN (HCC): Primary | ICD-10-CM

## 2021-07-21 DIAGNOSIS — L85.1 ACQUIRED KERATODERMA: ICD-10-CM

## 2021-07-21 DIAGNOSIS — B35.1 ONYCHOMYCOSIS: ICD-10-CM

## 2021-07-21 PROCEDURE — 11055 PARING/CUTG B9 HYPRKER LES 1: CPT | Performed by: PODIATRIST

## 2021-07-21 PROCEDURE — 11721 DEBRIDE NAIL 6 OR MORE: CPT | Performed by: PODIATRIST

## 2021-07-21 NOTE — PROGRESS NOTES
PATIENT:  Rico Fernandez  1950    ASSESSMENT/PLAN:  1  Type 2 diabetes mellitus with diabetic neuropathy, without long-term current use of insulin ([facility] )     2  Peripheral vascular disease, unspecified ([facility] )     3  Acquired keratoderma  Lesion Destruction   4  Onychomycosis  Debridement          Disease prevention and related risk factors of diabetes were identified and discussed  The patient was educated in proper foot wear for diabetics  Also educated in daily foot assessment and routine diabetic foot care  Compression, elevation, and diet to control LE edema  Awaits lymphedema management consultation  The patient will follow up in 9 weeks for further diabetic foot exam and care  PROCEDURE:  All mycotic toenails were reduced and debrided in length, width, and girth using a nail nipper and dremel  All hyperkeratotic skin lesion(s) were sharply pared with a scalpel with no bleeding or evidence of ulceration  Patient tolerated procedure(s) well without complications  HPI:  Rico Fernandez is a 73 y  o year old female seen for diabetic foot exam   The patient has class findings and DPN  Mild tingling sensation in her feet  No foot ulcer or infection  She complained of hypertrophic nails and callus  The patient denied any acute pedal disorder or injury  She has chronic LE edema  She did not go to lymphedema clinic yet        PAST MEDICAL HISTORY:  Past Medical History:   Diagnosis Date    AAA (abdominal aortic aneurysm) ([facility]     Anemia     Arthritis     Coronary artery disease     Diabetes mellitus ([facility]     Disease of thyroid gland     GERD (gastroesophageal reflux disease)     Hypertension     Morbid obesity ([facility]     Myocardial infarction ([facility]     Plantar fascial fibromatosis     PVD (peripheral vascular disease) ([facility]     Sleep apnea        PAST SURGICAL HISTORY:  Past Surgical History:   Procedure Laterality Date    CARDIAC SURGERY      cardiac stents x 8    COLONOSCOPY      colon polyps    HERNIA REPAIR      umb x 2    MT COLONOSCOPY FLX DX W/COLLJ SPEC WHEN PFRMD N/A 1/2/2019    Procedure: COLONOSCOPY;  Surgeon: Elvira Pacheco MD;  [facility];  Service: Gastroenterology    MT ESOPHAGOGASTRODUODENOSCOPY TRANSORAL DIAGNOSTIC N/A 1/2/2019    Procedure: ESOPHAGOGASTRODUODENOSCOPY (EGD);   Surgeon: Elvira Pacheco MD;  [facility];  Service: Gastroenterology    TUBAL LIGATION          ALLERGIES:  Penicillins and Lisinopril    MEDICATIONS:  Current Outpatient Medications   Medication Sig Dispense Refill    amLODIPine (NORVASC) 5 mg tablet Take 1 tablet by mouth daily        ammonium lactate (LAC-HYDRIN) 12 % cream Apply topically as needed for dry skin 385 g 3    aspirin 81 MG tablet Take 1 tablet by mouth daily      atorvastatin (LIPITOR) 80 mg tablet Take 1 tablet by mouth daily at bedtime        carvedilol (COREG) 25 mg tablet Take 1 tablet by mouth 2 (two) times a day      cetirizine (ZyrTEC) 10 mg tablet Take 10 mg by mouth daily as needed        clopidogrel (PLAVIX) 75 mg tablet Take 1 tablet (75 mg total) by mouth daily 90 tablet 3    clopidogrel (PLAVIX) 75 mg tablet Take 75 mg by mouth daily      ferrous sulfate 325 (65 Fe) mg tablet Take 1 tablet by mouth daily with breakfast        fluticasone (FLONASE) 50 mcg/act nasal spray 2 sprays into each nostril daily as needed      furosemide (LASIX) 20 mg tablet Take 20 mg by mouth daily as needed       glipiZIDE (GLUCOTROL) 5 mg tablet Take 1 tablet by mouth 2 (two) times a day      ketotifen (ZADITOR) 0 025 % ophthalmic solution Administer 1 drop to both eyes as needed      Lancets (ONETOUCH ULTRASOFT) lancets by Does not apply route      lansoprazole (PREVACID) 30 mg capsule Take 1 capsule by mouth daily      levothyroxine 50 mcg tablet Take 50 mcg by mouth daily        losartan (COZAAR) 100 MG tablet Take 1 tablet by mouth daily      metFORMIN (GLUCOPHAGE) 1000 MG tablet Take 1 tablet by mouth 2 (two) times a day      mometasone (ASMANEX 30 METERED DOSES) 220 MCG/INH inhaler Inhale as needed        nitroglycerin (NITROSTAT) 0 4 mg SL tablet Place 1 tablet (0 4 mg total) under the tongue every 5 (five) minutes as needed for chest pain 90 tablet 3    OneTouch Ultra test strip       polyethylene glycol (GOLYTELY) 4000 mL solution At 6:00 pm the day prior to scheduled capsule procedure, mix the gallon of Golytely as directed  Drink ONLY ONE HALF gallon of Golytely  4000 mL 0    potassium chloride (KLOR-CON M20) 20 mEq tablet Take by mouth 2 (two) times a day as needed        terazosin (HYTRIN) 5 mg capsule Take 2 capsules by mouth daily at bedtime        traMADol (ULTRAM) 50 mg tablet Take 1 tablet by mouth every 6 (six) hours as needed       No current facility-administered medications for this visit  SOCIAL HISTORY:  Social History     Socioeconomic History    Marital status: /Civil Union     Spouse name: None    Number of children: None    Years of education: None    Highest education level: None   Occupational History    None   Tobacco Use    Smoking status: Former Smoker     Packs/day: 1 50     Years: 30 00     Pack years: 45 00     Quit date:      Years since quittin 5    Smokeless tobacco: Never Used   Substance and Sexual Activity    Alcohol use: Yes     Alcohol/week: 1 0 standard drinks     Types: 1 Glasses of wine per week     Comment: rare-2 x/ year    Drug use: No    Sexual activity: None   Other Topics Concern    None   Social History Narrative    None     Social Determinants of Health     Financial Resource Strain:     Difficulty of Paying Living Expenses:    Food Insecurity:     Worried About Running Out of Food in the Last Year:     Ran Out of Food in the Last Year:    Transportation Needs:     Lack of Transportation (Medical):      Lack of Transportation (Non-Medical):    Physical Activity:     Days of Exercise per Week:     Minutes of Exercise per Session:    Stress:     Feeling of Stress :    Social Connections:     Frequency of Communication with Friends and Family:     Frequency of Social Gatherings with Friends and Family:     Attends Cheondoism Services:     Active Member of Clubs or Organizations:     Attends Club or Organization Meetings:     Marital Status:    Intimate Partner Violence:     Fear of Current or Ex-Partner:     Emotionally Abused:     Physically Abused:     Sexually Abused:         REVIEW OF SYSTEMS:  GENERAL: NAD, afebrile  HEART: No chest pain, or palpitation  RESPIRATORY:  No acute SOB or cough  GI: No Nausea, vomit or diarrhea  NEUROLOGIC: No syncope or acute weakness    PHYSICAL EXAM:  VASCULAR EXAM  Dorsalis pedis  +1, Posterior tibial artery  absent  The patient has class findings with skin atrophy, lack of digital hair, and nail dystrophy  There is +2 lower extremity edema bilaterally  Venous stasis skin changes noted BLE  NEUROLOGIC EXAM  Sensation is intact to light touch  Sensation is intact to 10gm monofilament  No focal neurologic deficit  DERMATOLOGIC EXAM:   No ulcer or cellulitis noted  The patient has hypertrophic toenails X 6 with discoloration, onycholysis, and subungal debris  No notable skin lesion  Patient has hyperkeratosis in right submet 5  MUSCULOSKELETAL EXAM:   No acute joint pain, edema, or redness  No acute musculoskeletal problem  Patient has deformity including hammertoe

## 2021-09-22 ENCOUNTER — OFFICE VISIT (OUTPATIENT)
Dept: PODIATRY | Facility: CLINIC | Age: 71
End: 2021-09-22
Payer: COMMERCIAL

## 2021-09-22 VITALS
SYSTOLIC BLOOD PRESSURE: 175 MMHG | WEIGHT: 293 LBS | BODY MASS INDEX: 51.91 KG/M2 | HEIGHT: 63 IN | HEART RATE: 69 BPM | DIASTOLIC BLOOD PRESSURE: 88 MMHG

## 2021-09-22 DIAGNOSIS — I73.9 PERIPHERAL VASCULAR DISEASE, UNSPECIFIED (HCC): ICD-10-CM

## 2021-09-22 DIAGNOSIS — E11.40 TYPE 2 DIABETES MELLITUS WITH DIABETIC NEUROPATHY, WITHOUT LONG-TERM CURRENT USE OF INSULIN (HCC): Primary | ICD-10-CM

## 2021-09-22 DIAGNOSIS — B35.1 ONYCHOMYCOSIS: ICD-10-CM

## 2021-09-22 DIAGNOSIS — L85.1 ACQUIRED KERATODERMA: ICD-10-CM

## 2021-09-22 DIAGNOSIS — I89.0 LYMPHEDEMA: ICD-10-CM

## 2021-09-22 PROCEDURE — 11721 DEBRIDE NAIL 6 OR MORE: CPT | Performed by: PODIATRIST

## 2021-09-22 PROCEDURE — 11055 PARING/CUTG B9 HYPRKER LES 1: CPT | Performed by: PODIATRIST

## 2021-09-22 NOTE — PROGRESS NOTES
PATIENT:  Checo Clark  1950    ASSESSMENT/PLAN:  1  Type 2 diabetes mellitus with diabetic neuropathy, without long-term current use of insulin ([facility] )     2  Peripheral vascular disease, unspecified ([facility] )     3  Onychomycosis     4  Lymphedema     5  Acquired keratoderma        Disease prevention and related risk factors of diabetes were identified and discussed  The patient was educated in proper foot wear for diabetics  Also educated in daily foot assessment and routine diabetic foot care  Compression, elevation, and diet to control LE edema  She is moving out of town  Recommended her to follow-up with podiatry  PROCEDURE:  All mycotic toenails were reduced and debrided in length, width, and girth using a nail nipper and dremel  All hyperkeratotic skin lesion(s) were sharply pared with a scalpel with no bleeding or evidence of ulceration  Patient tolerated procedure(s) well without complications  HPI:  Checo Clark is a 73 y  o year old female seen for diabetic foot exam   The patient has class findings and DPN  Mild tingling sensation in her feet  No foot ulcer or infection  She complained of hypertrophic nails and callus  The patient denied any acute pedal disorder or injury  She has chronic LE edema       PAST MEDICAL HISTORY:  Past Medical History:   Diagnosis Date    AAA (abdominal aortic aneurysm) (HCC)     Anemia     Arthritis     Coronary artery disease     Diabetes mellitus [facility]     Disease of thyroid gland     GERD (gastroesophageal reflux disease)     Hypertension     Morbid obesity ([facility]     Myocardial infarction ([facility] )     Plantar fascial fibromatosis     PVD (peripheral vascular disease) ([facility] )     Sleep apnea        PAST SURGICAL HISTORY:  Past Surgical History:   Procedure Laterality Date    CARDIAC SURGERY      cardiac stents x 8    COLONOSCOPY      colon polyps    HERNIA REPAIR      umb x 2    ME COLONOSCOPY FLX DX W/COLLJ SPEC WHEN PFRMD N/A 1/2/2019    Procedure: COLONOSCOPY;  Surgeon: Jaspal Mortensen MD;  Location:  MAIN OR;  Service: Gastroenterology    DE ESOPHAGOGASTRODUODENOSCOPY TRANSORAL DIAGNOSTIC N/A 1/2/2019    Procedure: ESOPHAGOGASTRODUODENOSCOPY (EGD);   Surgeon: Jaspal Mortensen MD;  Location: [facility];  Service: Gastroenterology    TUBAL LIGATION          ALLERGIES:  Penicillins and Lisinopril    MEDICATIONS:  Current Outpatient Medications   Medication Sig Dispense Refill    amLODIPine (NORVASC) 5 mg tablet Take 1 tablet by mouth daily        ammonium lactate (LAC-HYDRIN) 12 % cream Apply topically as needed for dry skin 385 g 3    aspirin 81 MG tablet Take 1 tablet by mouth daily      atorvastatin (LIPITOR) 80 mg tablet Take 1 tablet by mouth daily at bedtime        carvedilol (COREG) 25 mg tablet Take 1 tablet by mouth 2 (two) times a day      cetirizine (ZyrTEC) 10 mg tablet Take 10 mg by mouth daily as needed        clopidogrel (PLAVIX) 75 mg tablet Take 1 tablet (75 mg total) by mouth daily 90 tablet 3    clopidogrel (PLAVIX) 75 mg tablet Take 75 mg by mouth daily      ferrous sulfate 325 (65 Fe) mg tablet Take 1 tablet by mouth daily with breakfast        fluticasone (FLONASE) 50 mcg/act nasal spray 2 sprays into each nostril daily as needed      furosemide (LASIX) 20 mg tablet Take 20 mg by mouth daily as needed       glipiZIDE (GLUCOTROL) 5 mg tablet Take 1 tablet by mouth 2 (two) times a day      ketotifen (ZADITOR) 0 025 % ophthalmic solution Administer 1 drop to both eyes as needed      Lancets (ONETOUCH ULTRASOFT) lancets by Does not apply route      lansoprazole (PREVACID) 30 mg capsule Take 1 capsule by mouth daily      levothyroxine 50 mcg tablet Take 50 mcg by mouth daily        losartan (COZAAR) 100 MG tablet Take 1 tablet by mouth daily      metFORMIN (GLUCOPHAGE) 1000 MG tablet Take 1 tablet by mouth 2 (two) times a day      mometasone (ASMANEX 30 METERED DOSES) 220 MCG/INH inhaler Inhale as needed  nitroglycerin (NITROSTAT) 0 4 mg SL tablet Place 1 tablet (0 4 mg total) under the tongue every 5 (five) minutes as needed for chest pain 90 tablet 3    OneTouch Ultra test strip       polyethylene glycol (GOLYTELY) 4000 mL solution At 6:00 pm the day prior to scheduled capsule procedure, mix the gallon of Golytely as directed  Drink ONLY ONE HALF gallon of Golytely  4000 mL 0    potassium chloride (KLOR-CON M20) 20 mEq tablet Take by mouth 2 (two) times a day as needed        terazosin (HYTRIN) 5 mg capsule Take 2 capsules by mouth daily at bedtime        traMADol (ULTRAM) 50 mg tablet Take 1 tablet by mouth every 6 (six) hours as needed       No current facility-administered medications for this visit  SOCIAL HISTORY:  Social History     Socioeconomic History    Marital status: /Civil Union     Spouse name: None    Number of children: None    Years of education: None    Highest education level: None   Occupational History    None   Tobacco Use    Smoking status: Former Smoker     Packs/day: 1 50     Years: 30 00     Pack years: 45 00     Quit date:      Years since quittin 7    Smokeless tobacco: Never Used   Substance and Sexual Activity    Alcohol use: Yes     Alcohol/week: 1 0 standard drinks     Types: 1 Glasses of wine per week     Comment: rare-2 x/ year    Drug use: No    Sexual activity: None   Other Topics Concern    None   Social History Narrative    None     Social Determinants of Health     Financial Resource Strain:     Difficulty of Paying Living Expenses:    Food Insecurity:     Worried About Running Out of Food in the Last Year:     Ran Out of Food in the Last Year:    Transportation Needs:     Lack of Transportation (Medical):      Lack of Transportation (Non-Medical):    Physical Activity:     Days of Exercise per Week:     Minutes of Exercise per Session:    Stress:     Feeling of Stress :    Social Connections:     Frequency of Communication with Friends and Family:     Frequency of Social Gatherings with Friends and Family:     Attends Jain Services:     Active Member of Clubs or Organizations:     Attends Club or Organization Meetings:     Marital Status:    Intimate Partner Violence:     Fear of Current or Ex-Partner:     Emotionally Abused:     Physically Abused:     Sexually Abused:         REVIEW OF SYSTEMS:  GENERAL: NAD, afebrile  HEART: No chest pain, or palpitation  RESPIRATORY:  No acute SOB or cough  GI: No Nausea, vomit or diarrhea  NEUROLOGIC: No syncope or acute weakness    PHYSICAL EXAM:  VASCULAR EXAM  Dorsalis pedis  +1, Posterior tibial artery  absent  The patient has class findings with skin atrophy, lack of digital hair, and nail dystrophy  There is +2 lower extremity edema bilaterally  Venous stasis skin changes noted BLE  NEUROLOGIC EXAM  Sensation is intact to light touch  Sensation is intact to 10gm monofilament  No focal neurologic deficit  DERMATOLOGIC EXAM:   No ulcer or cellulitis noted  The patient has hypertrophic toenails X 6 with discoloration, onycholysis, and subungal debris  No notable skin lesion  Patient has hyperkeratosis in right submet 5  MUSCULOSKELETAL EXAM:   No acute joint pain, edema, or redness  No acute musculoskeletal problem  Patient has deformity including hammertoe

## (undated) DEVICE — SINGLE-USE BIOPSY FORCEPS: Brand: RADIAL JAW 4

## (undated) DEVICE — 1200CC GUARDIAN II: Brand: GUARDIAN

## (undated) DEVICE — SYRINGE 30ML LL

## (undated) DEVICE — BITE BLOCK ADULT 11FR OMNI BLOC

## (undated) DEVICE — TUBING SUCTION 5MM X 12 FT

## (undated) DEVICE — SYRINGE 50ML LL